# Patient Record
Sex: MALE | Race: WHITE | HISPANIC OR LATINO | Employment: FULL TIME | URBAN - METROPOLITAN AREA
[De-identification: names, ages, dates, MRNs, and addresses within clinical notes are randomized per-mention and may not be internally consistent; named-entity substitution may affect disease eponyms.]

---

## 2017-01-01 PROBLEM — F12.20 CANNABIS USE DISORDER, MODERATE, DEPENDENCE (H): Status: ACTIVE | Noted: 2017-01-01

## 2017-01-01 PROBLEM — F90.0 ADHD, PREDOMINANTLY INATTENTIVE TYPE: Status: ACTIVE | Noted: 2017-01-01

## 2017-01-09 ENCOUNTER — OFFICE VISIT (OUTPATIENT)
Dept: OTHER | Facility: OUTPATIENT CENTER | Age: 22
End: 2017-01-09

## 2017-01-09 DIAGNOSIS — F90.0 ADHD, PREDOMINANTLY INATTENTIVE TYPE: ICD-10-CM

## 2017-01-09 DIAGNOSIS — F41.9 ANXIETY: ICD-10-CM

## 2017-01-09 DIAGNOSIS — F12.20 CANNABIS USE DISORDER, MODERATE, DEPENDENCE (H): ICD-10-CM

## 2017-01-09 DIAGNOSIS — F33.2 MAJOR DEPRESSIVE DISORDER, RECURRENT, SEVERE WITHOUT PSYCHOTIC FEATURES (H): Primary | ICD-10-CM

## 2017-01-09 DIAGNOSIS — F64.0 GENDER DYSPHORIA IN ADOLESCENT AND ADULT: ICD-10-CM

## 2017-02-02 NOTE — PROGRESS NOTES
"[facility] -  Case Progress Note    Date of Service: 1/09/17  Client Name: Jerson Munoz  YOB: 1995  MRN:  [id number]  Treating Provider: Angela Collier (Nic\), Ph.D., Postdoctoral Fellow  Type of Session: Individual  Present in Session: Client Only  Number of Minutes: 53  Treatment plan completed:  2/3/16    Current Symptoms/Status:  Reported a history of gender and anatomic dysphoria related to his birth-assigned sex dating back to childhood and adolescence and high levels of distress about his secondary sex characteristics, expressed a desire to get rid of his breasts.     Endorsed symptoms of depression and anxiety: little interest or pleasure in doing things, feeling down, depressed, or hopeless, feeling tired or having little energy, feeling bad about yourself, and trouble concentrating, feeling worried, trouble relaxing, and feeling afraid, as if something awful might happen. He denied suicidal or self-harm thoughts.    Reported difficulties with concentration, organization, sustaining attention, becoming easily distracted and forgetful, and starting tasks/mental stimulating activities    Hx of alcohol and cannabis use. Reported concern about tolerance to these substances, frequency of use, level of preoccupation, impact on overall functioning, and failed efforts to control use. Client reported previously meeting with a psychiatrist who would not prescribe him medication due to his substance use. Client reported smoking marijuana consistently for the past two years and that he smokes marijuana to  get out of my head  and to be more social. Client reported that he does not  trust myself myself to commit to stopping marijuana so I can get Adderall.  Client also reported drinking alcohol 1-2 times per week. (Client does not appear to meet diagnostic criteria for an Alcohol Use Disorder at this time).    Progress Toward Treatment Goals:   Continued difficulty with organization and " "losing things that is making him feel stressed; discussed recommendation for additional support to assist with managing ADHD and substance use; noted that he is making efforts to take Zoloft more consistently    Intervention: Modality and Description/Response to Intervention:  CBT, interpersonal, and supportive psychotherapy techniques were used to assist client with processing thoughts and feeling associated with gender dysphoria, depression, anxiety, ADHD, and substance use. Client reported that he has been trying to take his Zoloft more consistently. He reported that this has been  going well  but that he did not take his Zoloft today. Reported that his missed group therapy because  I was a wreck that day. I would have been crying all over the place.  Stated that he could not remember what happened but he remembered that he  felt like I couldn t control the chaos at home  and felt like he had \"very low energy.\" Discussed the importance of having support and how both group and individual therapy can be helpful when struggling. Reported that his mother is having some legal issues and may be moving into his basement. Explored client s thoughts and feelings about potentially living with his mother. Client reported that this has increased his depressive and anxiety symptoms. Reported that he has been more emotionally dysregulated, crying more often, and wanting to sleep more. Stated that he is trying to  gain a measure of independence  and feels that this will make it more difficult. Discussed ways to make boundaries with his relationship with his mother in order for him to continue working on his independence. Towards the end of the session, discussed whether client had an intake appointment since calling the [employer] in our last session. Client stated that he has been  avoiding  by ignoring his phone and voicemails. Encouraged to follow through with getting additional support. Client decided to call the [employer] " "Skip in session, which resulted in him making an appointment for a phone intake for later the same day.    Assignment:  None    Interactive Complexity:  None    Diagnosis:  302.85 Gender Dysphoria in Adolescents and Adults  296.33 Major Depressive Disorder, Recurrent, Severe  300.00 Unspecified Anxiety Disorder  314.00 Attention Deficit Hyperactive Disorder, Predominantly inattentive presentation (per client report)   305.30 Cannabis Use Disorder, moderate-severe  R/O Alcohol Use Disorder    Plan / Need for Future Services:  Return for therapy in 1-2 weeks to address symptoms associated with gender dysphoria, depression, anxiety, ADHD, and substance use.      Angela \"Bertin\" Nando, Ph.D.  Postdoctoral Fellow    "

## 2017-02-07 NOTE — PROGRESS NOTES
I did not personally see the patient but I have reviewed and agree with the assessment and plan as documented in this note.  Eugenie Mata, PhD -- Supervisor   Licensed Psychologist

## 2017-02-13 ENCOUNTER — OFFICE VISIT (OUTPATIENT)
Dept: OTHER | Facility: OUTPATIENT CENTER | Age: 22
End: 2017-02-13

## 2017-02-13 DIAGNOSIS — F64.0 GENDER DYSPHORIA IN ADOLESCENT AND ADULT: ICD-10-CM

## 2017-02-13 DIAGNOSIS — F41.9 ANXIETY: ICD-10-CM

## 2017-02-13 DIAGNOSIS — F12.20 CANNABIS USE DISORDER, MODERATE, DEPENDENCE (H): ICD-10-CM

## 2017-02-13 DIAGNOSIS — F90.0 ADHD, PREDOMINANTLY INATTENTIVE TYPE: ICD-10-CM

## 2017-02-13 DIAGNOSIS — F33.2 MAJOR DEPRESSIVE DISORDER, RECURRENT, SEVERE WITHOUT PSYCHOTIC FEATURES (H): Primary | ICD-10-CM

## 2017-02-13 NOTE — PROGRESS NOTES
"[facility] -  Case Progress Note    Date of Service: 2/13/17  Client Name: Jerson Munoz  YOB: 1995  MRN:  [id number]  Treating Provider: Angela Collier (Nic\), Ph.D., Postdoctoral Fellow  Type of Session: Individual  Present in Session: Client Only  Number of Minutes: 40  Treatment plan completed:  2/3/16    Current Symptoms/Status:  Reported a history of gender and anatomic dysphoria related to his birth-assigned sex dating back to childhood and adolescence and high levels of distress about his secondary sex characteristics, expressed a desire to get rid of his breasts.     Endorsed symptoms of depression and anxiety: little interest or pleasure in doing things, feeling down, depressed, or hopeless, feeling tired or having little energy, feeling bad about yourself, and trouble concentrating, feeling worried, trouble relaxing, and feeling afraid, as if something awful might happen. He denied suicidal or self-harm thoughts.    Reported difficulties with concentration, organization, sustaining attention, becoming easily distracted and forgetful, and starting tasks/mental stimulating activities    Hx of alcohol and cannabis use. Reported concern about tolerance to these substances, frequency of use, level of preoccupation, impact on overall functioning, and failed efforts to control use. Client reported previously meeting with a psychiatrist who would not prescribe him medication due to his substance use. Client reported smoking marijuana consistently for the past two years and that he smokes marijuana to  get out of my head  and to be more social. Client reported that he does not  trust myself myself to commit to stopping marijuana so I can get Adderall.  Client also reported drinking alcohol 1-2 times per week. (Client does not appear to meet diagnostic criteria for an Alcohol Use Disorder at this time).    Progress Toward Treatment Goals:   Arrived to the session 20 minutes late. Client " noted that he is continues to be stressed and has been engaging in negative self-talk. Reported avoiding phone calls from the [facility]    Intervention: Modality and Description/Response to Intervention:  CBT, interpersonal, and supportive psychotherapy techniques were used to assist client with processing thoughts and feeling associated with gender dysphoria, depression, anxiety, ADHD, and substance use. Client began the session visibly upset as evidenced by him crying, stating that he was stressed about receiving a letter that he would need to contact his student loan department to prevent his loans from going into default. Discussed steps client could take to call the loan  to address this issue before the deadline. Client stated that he could ask his friend to sit with him while he makes the phone call. Client then stated that he was more interested in attending a social event in the city than making this phone call. Explored client s avoidance and client stated that he would rather do something he would enjoy than make a stressful phone call. Normalized client s difficulty with following through with something difficult and stressful for him. Discussed the importance of prioritizing responsibilities and activities in his life. Client then began to raise his voice stated that he knows that he is dependent on other people to complete his responsibilities. Became argumentative with therapist and noted that he feels like no one can help him or fix this situation for him. Offered to assist client in making this call in session. Client declined and stated that he feels more stressed and depressed since receiving this letter. Noted that the letter is  symbolic that my life is going nowhere  and reported having thoughts that  I keep failing.  Validated client feeling stress and discussed challenging his negative self-talk. Used strengths-based interventions to highlight that client made it to this  "appointment, created steps to complete the phone call, and brought the letter in meaning he prepared for the session. Towards the end of the session, discussed the importance of client receiving additional support. Discussed if he started having appointments with the [facility], and client stated that he is avoiding the phone calls. Throughout the session, client presented as visibly upset (crying), low mood, irritable, and anxious.     Assignment:  None    Interactive Complexity:  Communication difficulties present during the current psychiatric procedure included the need to manage maladaptive communication related to high anxiety, high reactivity, repeated questions, and disagreement that complicated delivery of care. Specifically, client presented as emotionally reactive as evidenced by his crying, raising his voice, and being argumentative at times.     Diagnosis:  302.85 Gender Dysphoria in Adolescents and Adults  296.33 Major Depressive Disorder, Recurrent, Severe  300.00 Unspecified Anxiety Disorder  314.00 Attention Deficit Hyperactive Disorder, Predominantly inattentive presentation (per client report)   305.30 Cannabis Use Disorder, moderate-severe  R/O Alcohol Use Disorder    Plan / Need for Future Services:  Return for therapy in 1-2 weeks to address symptoms associated with gender dysphoria, depression, anxiety, ADHD, and substance use.      Angela \"Bertin\" Nando, Ph.D.  Postdoctoral Fellow    "

## 2017-02-13 NOTE — MR AVS SNAPSHOT
After Visit Summary   2/13/2017    Jerson Munoz    MRN: [id number]           Patient Information     Date Of Birth          1995        Visit Information        Provider Department      2/13/2017 1:00 PM Angela Collier, PhD [facility]        Today's Diagnoses     Major depressive disorder, recurrent, severe without psychotic features (H)    -  1    Anxiety        ADHD, predominantly inattentive type        Cannabis use disorder, moderate, dependence (H)        Gender dysphoria in adolescent and adult           Follow-ups after your visit        Your next 10 appointments already scheduled     Mar 08, 2017  2:00 PM CST   INDIVIDUAL THERAPY with Angela Collier, PhD   [facility] ([facility])    [address]  [address]  [facility] [address]   [phone]            Mar 22, 2017  2:00 PM CDT   INDIVIDUAL THERAPY with Angela Collier,    [facility] ([facility])    [address]  [address]  [facility] [address]   [phone]              Who to contact     Please call your clinic at [phone] to:    Ask questions about your health    Make or cancel appointments    Discuss your medicines    Learn about your test results    Speak to your doctor   If you have compliments or concerns about an experience at your clinic, or if you wish to file a complaint, please contact [facility] Patient Relations at [phone] or email us at [email or web address]         Additional Information About Your Visit        MyChart Information     [employer] gives you secure access to your electronic health record. If you see a primary care provider, you can also send messages to your care team and make appointments. If you have questions, please call your primary care clinic.  If you do not have a primary care provider, please call [phone] and they will assist you.       [employer] is an electronic gateway that provides easy, online access to your medical records. With [employer], you can request a clinic appointment, read your test results, renew a prescription or communicate with your care team.     To access your existing account, please contact your [facility] or call [phone] for assistance.        Care EveryWhere ID     This is your Care EveryWhere ID. This could be used by other organizations to access your [place] medical records  [id number]         Blood Pressure from Last 3 Encounters:   12/14/16 122/71   10/18/16 118/77   04/21/16 114/71    Weight from Last 3 Encounters:   12/14/16 67.6 kg (149 lb)   10/18/16 69.9 kg (154 lb)   04/21/16 59.4 kg (131 lb)              We Performed the Following     Individual Psychotherapy (38-52 min) [78935]     Psychotherapy Interactive Complexity [59673]        Primary Care Provider    None       No address on file        Thank you!     Thank you for choosing [facility]  for your care. Our goal is always to provide you with excellent care. Hearing back from our patients is one way we can continue to improve our services. Please take a few minutes to complete the written survey that you may receive in the mail after your visit with us. Thank you!             Your Updated Medication List - Protect others around you: Learn how to safely use, store and throw away your medicines at www.disposemymeds.org.          This list is accurate as of: 2/13/17 11:59 PM.  Always use your most recent med list.                   Brand Name Dispense Instructions for use    triamcinolone 55 MCG/ACT Inhaler    NASACORT     Spray 2 sprays into both nostrils daily       ZOLOFT PO      Take 200 mg by mouth

## 2017-03-08 ENCOUNTER — OFFICE VISIT (OUTPATIENT)
Dept: OTHER | Facility: OUTPATIENT CENTER | Age: 22
End: 2017-03-08

## 2017-03-08 DIAGNOSIS — F64.0 GENDER DYSPHORIA IN ADOLESCENT AND ADULT: ICD-10-CM

## 2017-03-08 DIAGNOSIS — F41.9 ANXIETY: ICD-10-CM

## 2017-03-08 DIAGNOSIS — F12.20 CANNABIS USE DISORDER, MODERATE, DEPENDENCE (H): Primary | ICD-10-CM

## 2017-03-08 DIAGNOSIS — F90.0 ADHD, PREDOMINANTLY INATTENTIVE TYPE: ICD-10-CM

## 2017-03-08 DIAGNOSIS — F33.2 MAJOR DEPRESSIVE DISORDER, RECURRENT, SEVERE WITHOUT PSYCHOTIC FEATURES (H): ICD-10-CM

## 2017-03-08 ASSESSMENT — ANXIETY QUESTIONNAIRES
3. WORRYING TOO MUCH ABOUT DIFFERENT THINGS: NOT AT ALL
5. BEING SO RESTLESS THAT IT IS HARD TO SIT STILL: NOT AT ALL
6. BECOMING EASILY ANNOYED OR IRRITABLE: NOT AT ALL
7. FEELING AFRAID AS IF SOMETHING AWFUL MIGHT HAPPEN: NOT AT ALL
2. NOT BEING ABLE TO STOP OR CONTROL WORRYING: NOT AT ALL
GAD7 TOTAL SCORE: 0
1. FEELING NERVOUS, ANXIOUS, OR ON EDGE: NOT AT ALL

## 2017-03-08 ASSESSMENT — PATIENT HEALTH QUESTIONNAIRE - PHQ9: 5. POOR APPETITE OR OVEREATING: NOT AT ALL

## 2017-03-08 NOTE — MR AVS SNAPSHOT
After Visit Summary   3/8/2017    Jerson Munoz    MRN: [id number]           Patient Information     Date Of Birth          1995        Visit Information        Provider Department      3/8/2017 2:00 PM Angela Collier, PhD [facility]        Today's Diagnoses     Cannabis use disorder, moderate, dependence (H)    -  1    Major depressive disorder, recurrent, severe without psychotic features (H)        ADHD, predominantly inattentive type        Anxiety        Gender dysphoria in adolescent and adult           Follow-ups after your visit        Your next 10 appointments already scheduled     Mar 22, 2017  2:00 PM CDT   INDIVIDUAL THERAPY with Angela Collier, PhD   [facility]    [address] [facility] [address]  [address]  [facility] [address]   [phone]              Who to contact     Please call your clinic at [phone] to:    Ask questions about your health    Make or cancel appointments    Discuss your medicines    Learn about your test results    Speak to your doctor   If you have compliments or concerns about an experience at your clinic, or if you wish to file a complaint, please contact [facility] Patient Relations at [phone] or email us at [email or web address]         Additional Information About Your Visit        MyChart Information     [employer] gives you secure access to your electronic health record. If you see a primary care provider, you can also send messages to your care team and make appointments. If you have questions, please call your primary care clinic.  If you do not have a primary care provider, please call [phone] and they will assist you.      [employer] is an electronic gateway that provides easy, online access to your medical records. With [employer], you can request a clinic appointment, read your test results, renew a prescription or communicate with your care  team.     To access your existing account, please contact your [facility] or call [phone] for assistance.        Care EveryWhere ID     This is your Care EveryWhere ID. This could be used by other organizations to access your [place] medical records  [id number]         Blood Pressure from Last 3 Encounters:   12/14/16 122/71   10/18/16 118/77   04/21/16 114/71    Weight from Last 3 Encounters:   12/14/16 67.6 kg (149 lb)   10/18/16 69.9 kg (154 lb)   04/21/16 59.4 kg (131 lb)              We Performed the Following     Individual Psychotherapy (53+ min) [43549]     Psychotherapy Interactive Complexity [04786]        Primary Care Provider    None       No address on file        Thank you!     Thank you for choosing [facility]  for your care. Our goal is always to provide you with excellent care. Hearing back from our patients is one way we can continue to improve our services. Please take a few minutes to complete the written survey that you may receive in the mail after your visit with us. Thank you!             Your Updated Medication List - Protect others around you: Learn how to safely use, store and throw away your medicines at www.disposemymeds.org.          This list is accurate as of: 3/8/17 11:59 PM.  Always use your most recent med list.                   Brand Name Dispense Instructions for use    triamcinolone 55 MCG/ACT Inhaler    NASACORT     Spray 2 sprays into both nostrils daily       ZOLOFT PO      Take 200 mg by mouth

## 2017-03-08 NOTE — PROGRESS NOTES
"[facility] -  Case Progress Note    Date of Service: 3/08/17  Client Name: Jerson Munoz  YOB: 1995  MRN:  [id number]  Treating Provider: Angela Collier (Nic\), Ph.D., Postdoctoral Fellow  Type of Session: Individual  Present in Session: Client Only  Number of Minutes: 60  Treatment plan completed: 03/08/17    Current Symptoms/Status:  Reported a history of gender and anatomic dysphoria related to his birth-assigned sex dating back to childhood and adolescence and high levels of distress about his secondary sex characteristics; expressed a desire to get rid of his breasts.     Endorsed symptoms of depression and anxiety: little interest or pleasure in doing things; feeling down, depressed, or hopeless; feeling tired or having little energy; changes in appetite/eating; feeling bad about yourself; trouble concentrating; thinking he'd be better off dead or of hurting himself; hopelessness/helplessness; irritable; ruminating; and anxiety in social situations.      Reported difficulties with concentration, organization, sustaining attention, becoming easily distracted and forgetful, and starting tasks/mental stimulating activities    Hx of alcohol and cannabis use. Reported concern about tolerance to these substances, frequency of use, level of preoccupation, impact on overall functioning, and failed efforts to control use. Client reported previously meeting with a psychiatrist who would not prescribe him medication due to his substance use. Client reported smoking marijuana consistently for the past two years and that he smokes marijuana to  get out of my head  and to be more social. Client also reported drinking alcohol 1-2 times per week. (Client does not appear to meet diagnostic criteria for an Alcohol Use Disorder at this time).    Progress Toward Treatment Goals:   Presented as emotionally reactive, easily irritated, and argumentative; reported smoking marijuana and little to no " "interest in stopping; completed treatment plan update    Intervention: Modality and Description/Response to Intervention:  CBT, interpersonal, and supportive psychotherapy techniques were used to assist client with processing thoughts and feeling associated with gender dysphoria, depression, anxiety, ADHD, and substance use. Client reported that he is \"doing better\" and attributed his improved mood to completing tasks at home, changes in the weather, and smoking marijuana. Client reported that he has been smoking marijuana \"a couple of times a day\" for the past 2-3 weeks. He reported not smoking since yesterday due to him running out of marijuana. Reported little to no desire to stop substance use. Reported that he was \"frustrated with [his] lack of self-discipline.\" Discussed ways to cope with frustrations and complete tasks he does not feel motivated to do. Client reported that his \"brain chemistry keeps me from doing things.\" Discussed what client meant by this comment. Client raised his voice saying that he was not using this as an excuse and that \"self-medicating\" helped him to complete tasks at home. Attempted to identify other ways client can motivate himself to complete tasks. Client raised his voice and stated that it is impossible for him to do tasks because \"my brain chemistry.\" Validated client feeling stuck and frustrated. Client interrupted therapist, became argumentative. Reported with a raised voice volume that his brain keeps him from doing things. Processed client's irritability and argumentative presentation. Client reported being emotionally reactive. Discussed client having the expectation that therapist would respond in similar ways to people in his past. Discussed what happened in client's past. Client agreed to try to listen before reacting in the future. Then, completed treatment plan update. Before ending the session, discussed changes in group therapy and that client will no longer be in " "the Transgender Young Adult group. Client stated he understood and had no questions.    Assignment:  Arrive on time to next appointment    Interactive Complexity:  Communication difficulties present during the current psychiatric procedure included the need to manage maladaptive communication related to high anxiety, high reactivity, repeated questions, and disagreement that complicated delivery of care. Specifically, client presented as emotionally reactive as evidenced by him raising his voice and being argumentative at times.     Diagnosis:  302.85 Gender Dysphoria in Adolescents and Adults  296.33 Major Depressive Disorder, Recurrent, Severe  300.00 Unspecified Anxiety Disorder  314.00 Attention Deficit Hyperactive Disorder, Predominantly inattentive presentation (per client report)   305.30 Cannabis Use Disorder, moderate-severe  R/O Alcohol Use Disorder    Plan / Need for Future Services:  Return for therapy in 1-2 weeks to address symptoms associated with gender dysphoria, depression, anxiety, ADHD, and substance use.      Angela \"Bertin\"Nando, Ph.D.  Postdoctoral Fellow  "

## 2017-03-18 ASSESSMENT — PATIENT HEALTH QUESTIONNAIRE - PHQ9: SUM OF ALL RESPONSES TO PHQ QUESTIONS 1-9: 10

## 2017-03-18 ASSESSMENT — ANXIETY QUESTIONNAIRES: GAD7 TOTAL SCORE: 0

## 2017-03-20 NOTE — PROGRESS NOTES
I did not personally see the patient.  I reviewed and agree with the assessment and plan as documented in this note. Nanette Viveros, PhD, LP

## 2018-05-22 ENCOUNTER — OFFICE VISIT - HEALTHEAST (OUTPATIENT)
Dept: FAMILY MEDICINE | Facility: CLINIC | Age: 23
End: 2018-05-22

## 2018-05-22 DIAGNOSIS — F32.A DEPRESSION: ICD-10-CM

## 2018-05-22 DIAGNOSIS — J31.0 CHRONIC RHINITIS, UNSPECIFIED TYPE: ICD-10-CM

## 2018-05-22 DIAGNOSIS — F90.0 ATTENTION DEFICIT HYPERACTIVITY DISORDER (ADHD), PREDOMINANTLY INATTENTIVE TYPE: ICD-10-CM

## 2018-05-22 ASSESSMENT — MIFFLIN-ST. JEOR: SCORE: 1543.71

## 2018-05-30 ENCOUNTER — OFFICE VISIT - HEALTHEAST (OUTPATIENT)
Dept: ALLERGY | Facility: CLINIC | Age: 23
End: 2018-05-30

## 2018-05-30 DIAGNOSIS — J30.89 CHRONIC NONSEASONAL ALLERGIC RHINITIS DUE TO OTHER ALLERGEN: ICD-10-CM

## 2018-05-30 ASSESSMENT — MIFFLIN-ST. JEOR: SCORE: 1562.31

## 2019-12-03 ENCOUNTER — TRANSFERRED RECORDS (OUTPATIENT)
Dept: HEALTH INFORMATION MANAGEMENT | Facility: CLINIC | Age: 24
End: 2019-12-03

## 2020-03-10 ENCOUNTER — HEALTH MAINTENANCE LETTER (OUTPATIENT)
Age: 25
End: 2020-03-10

## 2020-03-24 ENCOUNTER — OFFICE VISIT (OUTPATIENT)
Dept: FAMILY MEDICINE | Facility: CLINIC | Age: 25
End: 2020-03-24

## 2020-03-24 VITALS
SYSTOLIC BLOOD PRESSURE: 107 MMHG | HEIGHT: 66 IN | WEIGHT: 136.8 LBS | HEART RATE: 61 BPM | RESPIRATION RATE: 16 BRPM | DIASTOLIC BLOOD PRESSURE: 70 MMHG | TEMPERATURE: 98.6 F | OXYGEN SATURATION: 100 % | BODY MASS INDEX: 21.98 KG/M2

## 2020-03-24 DIAGNOSIS — F33.1 MAJOR DEPRESSIVE DISORDER, RECURRENT EPISODE, MODERATE (H): Primary | ICD-10-CM

## 2020-03-24 DIAGNOSIS — F64.0 GENDER DYSPHORIA IN ADOLESCENT AND ADULT: Chronic | ICD-10-CM

## 2020-03-24 DIAGNOSIS — F41.9 ANXIETY: ICD-10-CM

## 2020-03-24 DIAGNOSIS — F12.20 CANNABIS USE DISORDER, MODERATE, DEPENDENCE (H): ICD-10-CM

## 2020-03-24 PROBLEM — J31.0 CHRONIC RHINITIS: Chronic | Status: ACTIVE | Noted: 2018-05-23

## 2020-03-24 PROBLEM — J31.0 CHRONIC RHINITIS: Status: ACTIVE | Noted: 2018-05-23

## 2020-03-24 RX ORDER — AZELASTINE HYDROCHLORIDE, FLUTICASONE PROPIONATE 137; 50 UG/1; UG/1
2 SPRAY, METERED NASAL 2 TIMES DAILY
COMMUNITY
End: 2020-03-28

## 2020-03-24 RX ORDER — TESTOSTERONE 20.25 MG/1.25G
GEL TOPICAL
COMMUNITY
Start: 2020-02-19 | End: 2021-03-15 | Stop reason: ALTCHOICE

## 2020-03-24 RX ORDER — BUSPIRONE HYDROCHLORIDE 10 MG/1
10 TABLET ORAL
COMMUNITY
End: 2020-12-01

## 2020-03-24 RX ORDER — AZELASTINE 1 MG/ML
137 SPRAY, METERED NASAL
COMMUNITY
Start: 2018-05-30 | End: 2020-03-26

## 2020-03-24 SDOH — SOCIAL STABILITY: SOCIAL INSECURITY: ARE YOU MARRIED, WIDOWED, DIVORCED, SEPARATED, NEVER MARRIED, OR LIVING WITH A PARTNER?: NEVER MARRIED

## 2020-03-24 SDOH — SOCIAL STABILITY: SOCIAL INSECURITY
WITHIN THE LAST YEAR, HAVE YOU BEEN RAPED OR FORCED TO HAVE ANY KIND OF SEXUAL ACTIVITY BY YOUR PARTNER OR EX-PARTNER?: NO

## 2020-03-24 SDOH — HEALTH STABILITY: MENTAL HEALTH: HOW OFTEN DO YOU HAVE A DRINK CONTAINING ALCOHOL?: MONTHLY OR LESS

## 2020-03-24 SDOH — HEALTH STABILITY: MENTAL HEALTH: HOW MANY DRINKS CONTAINING ALCOHOL DO YOU HAVE ON A TYPICAL DAY WHEN YOU ARE DRINKING?: 1 OR 2

## 2020-03-24 SDOH — SOCIAL STABILITY: SOCIAL INSECURITY
WITHIN THE LAST YEAR, HAVE YOU BEEN KICKED, HIT, SLAPPED, OR OTHERWISE PHYSICALLY HURT BY YOUR PARTNER OR EX-PARTNER?: NO

## 2020-03-24 SDOH — SOCIAL STABILITY: SOCIAL INSECURITY: WITHIN THE LAST YEAR, HAVE YOU BEEN HUMILIATED OR EMOTIONALLY ABUSED IN OTHER WAYS BY YOUR PARTNER OR EX-PARTNER?: NO

## 2020-03-24 SDOH — HEALTH STABILITY: MENTAL HEALTH: HOW OFTEN DO YOU HAVE SIX OR MORE DRINKS ON ONE OCCASION?: LESS THAN MONTHLY

## 2020-03-24 SDOH — HEALTH STABILITY: MENTAL HEALTH
DO YOU FEEL STRESS - TENSE, RESTLESS, NERVOUS, OR ANXIOUS, OR UNABLE TO SLEEP AT NIGHT BECAUSE YOUR MIND IS TROUBLED ALL THE TIME - THESE DAYS?: TO SOME EXTENT

## 2020-03-24 SDOH — SOCIAL STABILITY: SOCIAL INSECURITY: WITHIN THE LAST YEAR, HAVE YOU BEEN AFRAID OF YOUR PARTNER OR EX-PARTNER?: NO

## 2020-03-24 ASSESSMENT — ANXIETY QUESTIONNAIRES
IF YOU CHECKED OFF ANY PROBLEMS ON THIS QUESTIONNAIRE, HOW DIFFICULT HAVE THESE PROBLEMS MADE IT FOR YOU TO DO YOUR WORK, TAKE CARE OF THINGS AT HOME, OR GET ALONG WITH OTHER PEOPLE: SOMEWHAT DIFFICULT
6. BECOMING EASILY ANNOYED OR IRRITABLE: SEVERAL DAYS
GAD7 TOTAL SCORE: 4
3. WORRYING TOO MUCH ABOUT DIFFERENT THINGS: NOT AT ALL
5. BEING SO RESTLESS THAT IT IS HARD TO SIT STILL: NOT AT ALL
7. FEELING AFRAID AS IF SOMETHING AWFUL MIGHT HAPPEN: NOT AT ALL
2. NOT BEING ABLE TO STOP OR CONTROL WORRYING: NOT AT ALL
1. FEELING NERVOUS, ANXIOUS, OR ON EDGE: SEVERAL DAYS

## 2020-03-24 ASSESSMENT — PATIENT HEALTH QUESTIONNAIRE - PHQ9
5. POOR APPETITE OR OVEREATING: MORE THAN HALF THE DAYS
SUM OF ALL RESPONSES TO PHQ QUESTIONS 1-9: 8

## 2020-03-24 ASSESSMENT — MIFFLIN-ST. JEOR: SCORE: 1543.27

## 2020-03-24 NOTE — PROGRESS NOTES
Preceptor Attestation:   Patient seen, evaluated and discussed with the resident. I have verified the content of the note, which accurately reflects my assessment of the patient and the plan of care.   Supervising Physician:  Breonna Pham MD

## 2020-03-24 NOTE — PATIENT INSTRUCTIONS
Here is the summary from today's visit.    Fill the release of information, drop it off at clinic whenever it's convenient.    Follow up plan    Please call the  at [phone] to schedule an appointment as needed.       Thank you for coming to [facility] today!  Patience Adams MD   >>>>> <<<<<  If you had laboratory testing and results need quick action we will call you at # [phone] (home) . If this is not the best number, please call our clinic or stop by the  to update your contact information.  If you need any refills please call your pharmacy and they will contact us. If you need to  your refill at a new pharmacy, please contact the new pharmacy directly. The new pharmacy will help you get your medications transferred faster.   If you have any concerns about today's visit or wish to schedule another appointment, please call our office during normal business hours [phone] (8-5:00 M-F)  If a referral was made to a [facility] and you don't get a call from central scheduling, please call [phone].  If a mammogram was ordered, call [facility] at [phone] to schedule or change your appointment.  If you had an XRay/CT/Ultrasound/MRI ordered, the number is [phone] to schedule or change your radiology appointment.     If you have urgent medical concerns please call [phone] at any time of the day.
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
## 2020-03-25 DIAGNOSIS — J31.0 CHRONIC RHINITIS: Primary | Chronic | ICD-10-CM

## 2020-03-25 ASSESSMENT — ANXIETY QUESTIONNAIRES: GAD7 TOTAL SCORE: 4

## 2020-03-25 NOTE — TELEPHONE ENCOUNTER
Verify that the refill encounter hasn't been started Yes    [facility] phone call message- patient requesting a refill:    Full Medication Name: azelastine (ASTELIN) 0.1 % nasal spray    Dose:      Pharmacy confirmed as   [id number] - [place], [place] - [address] AT [facility] [address]  [address]  [place] [facility] [address]  Phone: [phone] Fax: [phone]    [id number] - [place] - [address] AT [facility] [place] [address]  [address] [place] [place] [address]  Phone: [phone] Fax: [phone]    [address] [address]  Phone: [phone] Fax: [phone]  : Yes    Medication tab checked to see if medication has been sent  no    Additional Comments: pt request rx refill    OK to leave a message on voice mail? Yes    Advised patient refill may take up to 2 business days? Yes    Primary language: English      needed? No    Call taken on March 25, 2020 at 4:47 PM by Rusty Alonso    Route to [facility] MED REFILL

## 2020-03-25 NOTE — TELEPHONE ENCOUNTER
"Request for medication refill: azelastine (ASTELIN) 0.1 % nasal spray    Providers if patient needs an appointment and you are willing to give a one month supply please refill for one month and  send a letter/MyChart using \".SMILLIMITEDREFILL\" .smillimited and route chart to \"[facility] \" (Giving one month refill in non controlled medications is strongly recommended before denial)    If refill has been denied, meaning absolutely no refills without visit, please complete the smart phrase \".smirxrefuse\" and route it to the \"P [facility] MED REFILLS\"  pool to inform the patient and the pharmacy.    Jeffry Dash MA        "

## 2020-03-26 RX ORDER — AZELASTINE 1 MG/ML
1-2 SPRAY, METERED NASAL 2 TIMES DAILY
Qty: 30 ML | Refills: 1 | Status: SHIPPED | OUTPATIENT
Start: 2020-03-26 | End: 2020-11-04

## 2020-04-11 PROBLEM — E78.5 DYSLIPIDEMIA: Chronic | Status: ACTIVE | Noted: 2019-12-03

## 2020-04-11 PROBLEM — F12.20 CANNABIS USE DISORDER, MODERATE, DEPENDENCE (H): Chronic | Status: ACTIVE | Noted: 2017-01-01

## 2020-04-11 PROBLEM — E55.9 VITAMIN D DEFICIENCY: Status: ACTIVE | Noted: 2019-12-03

## 2020-06-29 ENCOUNTER — TELEPHONE (OUTPATIENT)
Dept: FAMILY MEDICINE | Facility: CLINIC | Age: 25
End: 2020-06-29

## 2020-06-29 DIAGNOSIS — F41.9 ANXIETY: Chronic | ICD-10-CM

## 2020-06-29 DIAGNOSIS — F33.1 MAJOR DEPRESSIVE DISORDER, RECURRENT EPISODE, MODERATE (H): Primary | Chronic | ICD-10-CM

## 2020-06-29 NOTE — TELEPHONE ENCOUNTER
Verify that the refill encounter hasn't been started Yes    [facility] phone call message- patient requesting a refill:    Full Medication Name: Sertraline HCl (ZOLOFT PO)     Dose: Take 100 mg by mouth Take 1 1/2 tablet once a day - Oral      Pharmacy confirmed as   [facility], [address] [place] [address]  [address] [place] [address]  Phone: [phone] Fax: [phone]  : Yes    Medication tab checked to see if medication has been sent  Yes    Additional Comments: Patient requesting refill of medication listed above. Author informed patient that medication may not be filled due to it be self reportated but the patient stated that they established care with the clinic to get refills for this medication.     OK to leave a message on voice mail? Yes    Advised patient refill may take up to 2 business days? Yes    Primary language: English      needed? No    Call taken on June 29, 2020 at 8:30 AM by Patricia Chacko to [facility] MED REFILL

## 2020-06-29 NOTE — TELEPHONE ENCOUNTER
"Request for medication refill:  Sertraline HCI (Zoloft PO)    -  Please advise  \"Patient requesting refill of medication listed above. Author informed patient that medication may not be filled due to it be self reportated but the patient stated that they established care with the clinic to get refills for this medication. \"    Providers if patient needs an appointment and you are willing to give a one month supply please refill for one month and  send a letter/MyChart using \".SMILLIMITEDREFILL\" .smillimited and route chart to \"P [facility] \" (Giving one month refill in non controlled medications is strongly recommended before denial)    If refill has been denied, meaning absolutely no refills without visit, please complete the smart phrase \".smirxrefuse\" and route it to the \"P [facility] MED REFILLS\"  pool to inform the patient and the pharmacy.    Jeffry Dash MA        "

## 2020-06-30 RX ORDER — SERTRALINE HYDROCHLORIDE 100 MG/1
150 TABLET, FILM COATED ORAL DAILY
Qty: 135 TABLET | Refills: 0 | Status: SHIPPED | OUTPATIENT
Start: 2020-06-30 | End: 2020-11-04

## 2020-06-30 NOTE — TELEPHONE ENCOUNTER
Patient calling to check status of requested refill. Patient stated they have been out of medication for a few days. Please advise.

## 2020-07-01 NOTE — TELEPHONE ENCOUNTER
Approved sertraline refill for 90 days. Medication verified with outside records.     PHQ 1/22/2016 3/8/2017 3/24/2020   PHQ-9 Total Score 10 10 8   Q9: Thoughts of better off dead/self-harm past 2 weeks Not at all Several days Not at all     NOAH-7 SCORE 1/22/2016 3/8/2017 3/24/2020   Total Score 2 0 4     Patience Adams MD

## 2020-07-03 ENCOUNTER — COMMUNICATION - HEALTHEAST (OUTPATIENT)
Dept: ALLERGY | Facility: CLINIC | Age: 25
End: 2020-07-03

## 2020-08-14 ENCOUNTER — OFFICE VISIT (OUTPATIENT)
Dept: URGENT CARE | Facility: URGENT CARE | Age: 25
End: 2020-08-14
Payer: COMMERCIAL

## 2020-08-14 VITALS
SYSTOLIC BLOOD PRESSURE: 122 MMHG | HEART RATE: 62 BPM | BODY MASS INDEX: 21.86 KG/M2 | WEIGHT: 136 LBS | HEIGHT: 66 IN | DIASTOLIC BLOOD PRESSURE: 85 MMHG | RESPIRATION RATE: 16 BRPM | OXYGEN SATURATION: 98 % | TEMPERATURE: 97.2 F

## 2020-08-14 DIAGNOSIS — J02.9 ACUTE SORE THROAT: Primary | ICD-10-CM

## 2020-08-14 LAB
DEPRECATED S PYO AG THROAT QL EIA: NEGATIVE
SPECIMEN SOURCE: NORMAL

## 2020-08-14 PROCEDURE — 99203 OFFICE O/P NEW LOW 30 MIN: CPT | Performed by: STUDENT IN AN ORGANIZED HEALTH CARE EDUCATION/TRAINING PROGRAM

## 2020-08-14 PROCEDURE — 87651 STREP A DNA AMP PROBE: CPT | Performed by: STUDENT IN AN ORGANIZED HEALTH CARE EDUCATION/TRAINING PROGRAM

## 2020-08-14 PROCEDURE — 40001204 ZZHCL STATISTIC STREP A RAPID: Performed by: STUDENT IN AN ORGANIZED HEALTH CARE EDUCATION/TRAINING PROGRAM

## 2020-08-14 ASSESSMENT — MIFFLIN-ST. JEOR: SCORE: 1539.64

## 2020-08-14 NOTE — PATIENT INSTRUCTIONS
Patient Education     Pharyngitis (Sore Throat), Report Pending    Pharyngitis (sore throat) is often due to a virus. It can also be caused by streptococcus (strep), bacteria. This is often called strep throat. Both viral and strep infections can cause throat pain that is worse when swallowing, aching all over, headache, and fever. Both types of infections are contagious. They may be spread by coughing, kissing, or touching others after touching your mouth or nose.  A test has been done to find out if you or your child have strep throat. Call this facility or your healthcare provider if you were not given your test results. If the test is positive for strep infection, you will need to take antibiotic medicines. A prescription can be called into your pharmacy at that time. If the test is negative, you probably have a viral pharyngitis. This does not need to be treated with antibiotics. Until you receive the results of the strep test, you should stay home from work. If your child is being tested, he or she should stay home from school.  Home care    Rest at home. Drink plenty of fluids so you won't get dehydrated.    If the test is positive for strep, you or your child should not go to work or school for the first 2 days of taking the antibiotics. After this time, you or your child will not be contagious. You or your child can then return to work or school when feeling better.     Use the antibiotic medicine for the full 10 days. Do not stop the medicine even if you or your child feel better. This is very important to make sure the infection is fully treated. It is also important to prevent medicine-resistant germs from growing. If you or your child were given an antibiotic shot, no more antibiotics are needed.    Use throat lozenges or numbing throat sprays to help reduce pain. Gargling with warm salt water will also help reduce throat pain. Dissolve 1/2 teaspoon of salt in 1 glass of warm water. Children can sip  on juice or a popsicle. Children 5 years and older can also suck on a lollipop or hard candy.    Don't eat salty or spicy foods or give them to your child. These can irritate the throat.  Other medicine for a child: You can give your child acetaminophen for fever, fussiness, or discomfort. In babies over 6 months of age, you may use ibuprofen instead of acetaminophen. If your child has chronic liver or kidney disease or ever had a stomach ulcer or GI bleeding, talk with your child s healthcare provider before giving these medicines. Aspirin should never be used by any child under 18 years of age who has a fever. It may cause severe liver damage.  Other medicine for an adult: You may use acetaminophen or ibuprofen to control pain or fever, unless another medicine was prescribed for this. If you have chronic liver or kidney disease or ever had a stomach ulcer or GI bleeding, talk with your healthcare provider before using these medicines.  Follow-up care  Follow up with your healthcare provider or our staff if you or your child don't get better over the next week.  When to seek medical advice  Call your healthcare provider right away if any of these occur:    Fever as directed by your healthcare provider. For children, seek care if:  ? Your child is of any age and has repeated fevers above 104 F (40 C).  ? Your child is younger than 2 years of age and has a fever of 100.4 F (38 C) for more than 1 day.  ? Your child is 2 years old or older and has a fever of 100.4 F (38 C) for more than 3 days.    New or worsening ear pain, sinus pain, or headache    Painful lumps in the back of neck    Stiff neck    Lymph nodes are getting larger     Can t swallow liquids, a lot of drooling, or can t open mouth wide due to throat pain    Signs of dehydration, such as very dark urine or no urine, sunken eyes, dizziness    Trouble breathing or noisy breathing    Muffled voice    New rash    Other symptoms getting worse  Prevention  Here  are steps you can take to help prevent an infection:    Keep good hand washing habits.    Don t have close contact with people who have sore throats, colds, or other upper respiratory infections.    Don t smoke, and stay away from secondhand smoke.    Stay up to date with of your vaccines.  Date Last Reviewed: 11/1/2017 2000-2019 [employer]. [address] [place], [place] [address]. All rights reserved. This information is not intended as a substitute for professional medical care. Always follow your healthcare professional's instructions.

## 2020-08-14 NOTE — PROGRESS NOTES
"       NATALIIA Arthur is a 24 year old  adult with a PMH significant for:     Patient Active Problem List   Diagnosis     Gender dysphoria in adolescent and adult     Anxiety     Major depressive disorder, recurrent episode, moderate (H)     Cannabis use disorder, moderate, dependence (H)     Chronic rhinitis     Dyslipidemia     Vitamin D deficiency       Acute Illness   Concerns: fever, sore throat  When did it start? Last Wednesday 8/5/20  Is it getting better, worse or staying the same? 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    Therapies Tried and outcome: Tylenol/IBU, Details: some relief          REVIEW OF SYSTEMS     Constitutional, HEENT, cardiovascular, pulmonary, GI, , musculoskeletal, neuro, skin, endocrine and psych systems are negative, except as otherwise noted.          OBJECTIVE     Vitals:    08/14/20 1749   BP: 122/85   Pulse: 62   Resp: 16   Temp: 97.2  F (36.2  C)   TempSrc: Oral   SpO2: 98%   Weight: 61.7 kg (136 lb)   Height: 1.676 m (5' 6\")     Body mass index is 21.95 kg/m .    Constitutional: Awake, alert, cooperative, no acute distress, and appears stated age.  Eyes: sclera clear, conjunctiva normal.  ENT: NC/AT, MMM, negative for peritonsilar abscess/trismus  Neck: Supple, symmetrical, trachea midline  Back: Symmetric, no curvature  Lungs: No increased WOB, CTABL, no crackles or wheezing appreciated.  Cardiovascular: RRR, normal S1 and S2, no S3 or S4, and no murmur appreciated.  Abdomen: Normal BS, soft, non-distended, non-tender  Musculoskeletal: No redness, warmth, or swelling of " the joints. Tone is normal.  Neurologic: A&Ox3.  Cranial nerves II-XII are grossly intact.  Sensory is intact and gait is normal.  Neuropsychiatric: Normal affect, mood, orientation, memory and insight.  Skin: No visible rashes, erythema, pallor, petechia or purpura.    Results for orders placed or performed in visit on 08/14/20 (from the past 24 hour(s))   Streptococcus A Rapid Scr w Reflx to PCR    Specimen: Throat   Result Value Ref Range    Strep Specimen Description Throat     Streptococcus Group A Rapid Screen Negative NEG^Negative        ASSESSMENT AND PLAN     Diagnoses and all orders for this visit:    Acute sore throat  Likely viral in etiology. Reassuring that COVID and rapid strep is negative. Advised patient on symptomatic management and hydration.   -     Streptococcus A Rapid Scr w Reflx to PCR  -     Group A Streptococcus PCR Throat Swab        Return if symptoms worsen or fail to improve.    Peter Pelletier MD  8/14/2020

## 2020-08-15 LAB
SPECIMEN SOURCE: NORMAL
STREP GROUP A PCR: NOT DETECTED

## 2020-11-04 ENCOUNTER — MYC REFILL (OUTPATIENT)
Dept: FAMILY MEDICINE | Facility: CLINIC | Age: 25
End: 2020-11-04

## 2020-11-04 DIAGNOSIS — F41.9 ANXIETY: Chronic | ICD-10-CM

## 2020-11-04 DIAGNOSIS — F33.1 MAJOR DEPRESSIVE DISORDER, RECURRENT EPISODE, MODERATE (H): Chronic | ICD-10-CM

## 2020-11-04 DIAGNOSIS — J31.0 CHRONIC RHINITIS: Chronic | ICD-10-CM

## 2020-11-04 RX ORDER — SERTRALINE HYDROCHLORIDE 100 MG/1
150 TABLET, FILM COATED ORAL DAILY
Qty: 135 TABLET | Refills: 0 | Status: SHIPPED | OUTPATIENT
Start: 2020-11-04 | End: 2021-02-04

## 2020-11-04 RX ORDER — AZELASTINE 1 MG/ML
1-2 SPRAY, METERED NASAL 2 TIMES DAILY
Qty: 30 ML | Refills: 1 | Status: SHIPPED | OUTPATIENT
Start: 2020-11-04 | End: 2021-02-11

## 2020-11-04 NOTE — TELEPHONE ENCOUNTER
"Patient requesting refill of azelastine nasal spray via mychart. Last office visit 3/24/20. Routing to PCP to approve if appropriate.   Eunice Wilson RN      Request for medication refill:    Providers if patient needs an appointment and you are willing to give a one month supply please refill for one month and  send a letter/MyChart using \".SMILLIMITEDREFILL\" .smillimited and route chart to \"P [facility] \" (Giving one month refill in non controlled medications is strongly recommended before denial)    If refill has been denied, meaning absolutely no refills without visit, please complete the smart phrase \".smirxrefuse\" and route it to the \"P [facility] MED REFILLS\"  pool to inform the patient and the pharmacy.    Eunice Wilson RN        "

## 2020-11-04 NOTE — TELEPHONE ENCOUNTER

## 2020-12-27 ENCOUNTER — HEALTH MAINTENANCE LETTER (OUTPATIENT)
Age: 25
End: 2020-12-27

## 2021-02-02 DIAGNOSIS — F33.1 MAJOR DEPRESSIVE DISORDER, RECURRENT EPISODE, MODERATE (H): Chronic | ICD-10-CM

## 2021-02-02 DIAGNOSIS — F41.9 ANXIETY: Chronic | ICD-10-CM

## 2021-02-02 NOTE — TELEPHONE ENCOUNTER

## 2021-02-04 RX ORDER — SERTRALINE HYDROCHLORIDE 100 MG/1
150 TABLET, FILM COATED ORAL DAILY
Qty: 45 TABLET | Refills: 0 | Status: SHIPPED | OUTPATIENT
Start: 2021-02-04 | End: 2021-03-15

## 2021-02-04 NOTE — TELEPHONE ENCOUNTER
Received request for sertraline refill. Patient seen once at [facility] in 3/2020, was supposed to follow-up a month later, but has not been seen since.   30 day Rx approved, no more refill without visit. Message added to medication instructions, forwarded to FD to contact patient to offer appointment.   Patience Adams MD

## 2021-02-11 DIAGNOSIS — J31.0 CHRONIC RHINITIS: Chronic | ICD-10-CM

## 2021-02-11 RX ORDER — AZELASTINE 1 MG/ML
1-2 SPRAY, METERED NASAL 2 TIMES DAILY
Qty: 30 ML | Refills: 1 | Status: SHIPPED | OUTPATIENT
Start: 2021-02-11 | End: 2021-03-01

## 2021-03-01 DIAGNOSIS — J31.0 CHRONIC RHINITIS: Chronic | ICD-10-CM

## 2021-03-01 RX ORDER — AZELASTINE 1 MG/ML
1-2 SPRAY, METERED NASAL 2 TIMES DAILY
Qty: 30 ML | Refills: 1 | Status: SHIPPED | OUTPATIENT
Start: 2021-03-01 | End: 2021-03-15

## 2021-03-01 NOTE — TELEPHONE ENCOUNTER

## 2021-03-14 NOTE — PROGRESS NOTES
[facility] Clinic visit      Assessment & Plan     Jerson is a 25 year old male, who was assessed for the following problems:    Problem List Items Addressed This Visit        Medium    Gender dysphoria in adolescent and adult - Primary (Chronic)     Continuing testosterone at [facility] ([place] [facility], patient was not aware that we provided gender support care at our clinic). Doing labs today as not recently done at [facility]. Doing well on stable dose.   Refilled T as patient almost out.   Patient signing PEREZ for [facility] records.            Relevant Medications    testosterone (ANDROGEL 1.62 % PUMP) 20.25 MG/ACT gel    Other Relevant Orders    Comprehensive metabolic panel (Completed)    Testosterone total (Completed)    Hemoglobin A1c (Completed)    CBC with platelets (Completed)    Major depressive disorder, recurrent episode, moderate (H) (Chronic)     Depressed mood well controlled, no SI currently. Here mostly to discussed worsened social anxiety.          Relevant Medications    sertraline (ZOLOFT) 100 MG tablet    busPIRone (BUSPAR) 10 MG tablet    Dyslipidemia (Chronic)    Relevant Orders    Lipid panel reflex to direct LDL Non-fasting (Completed)       Low    Anxiety (Chronic)     Reports that it is mostly social anxiety, worsened recently by having to interact more over the phone at work. Not so much with gender ID since started hormones.   Refilled sertraline, buspirone, no changes in dose  Patient interested in therapy, concerned about insurance coverage/cost.   Provided list of trans-friendly therapists. Encouraged to contact insurance to enquire about this and possible gender-reaffirming surgery in the future.          Relevant Medications    sertraline (ZOLOFT) 100 MG tablet    busPIRone (BUSPAR) 10 MG tablet    Chronic rhinitis (Chronic)     Well controlled on azelastine.   Did allergen test a while ago that was + for dust mites, would like to start a new medication he's researched.  Placed referral  to allergy for further evaluation and management.          Relevant Medications    azelastine (ASTELIN) 0.1 % nasal spray    Other Relevant Orders    ALLERGY/ASTHMA ADULT REFERRAL - INTERNAL         Assessment requiring an independent historian(s) - family - mother    Medications Discontinued During This Encounter   Medication Reason     Testosterone 20.25 MG/1.25GM (1.62%) GEL Alternate therapy     busPIRone (BUSPAR) 10 MG tablet Reorder     sertraline (ZOLOFT) 100 MG tablet Reorder     azelastine (ASTELIN) 0.1 % nasal spray Reorder     Follow-up in 3-6 months pending results  Due for physical    [facility]    Options for treatment and follow-up care were reviewed with the patient/caregivers. They engaged in the decision making process and verbalized understanding of the options discussed and agreed with the final plan.    Patience Adams MD  Family Medicine Resident [facility], PGY-3  Phone: [phone]    [facility]    Subjective   History obtained from patient, mother, and chart review.  Patient speaks English,  was not present for this visit.      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 The family is trying to get her in to a specific therapist.    Still doing testosterone, but at [facility]. Needs refills.   Sexually attracted to cis-male  Romantically attracted to all genders    Not sexually active currently   No plan for biological children    ROS  General    Fat redistribution:  no    Weight change: no HEENT    Voice change: YES     Cardiovascular (CV)    Chest Pains: no    Shortness of breath: no Chest    Decreased exercise tolerance:  no    Breast changes/development: not applicable     Gastrointestinal (GI)    Abdominal pain: no    Change in appetite: no Skin    Acne or oily skin: yes, not bothersome    Change in hair: YES - some     Genitourinary ()    Abnormal vaginal bleeding: no     Decreased spontaneous erections: not applicable    Change in libido: YES - not a problem    New sexual partners: no Musculoskeletal    Leg pain or swelling: not applicable     Mental health:  PHQ 1/22/2016 3/8/2017 3/24/2020   PHQ-9 Total Score 10 10 8   Q9: Thoughts of better off dead/self-harm past 2 weeks Not at all Several days Not at all     NOAH-7 SCORE 1/22/2016 3/8/2017 3/24/2020   Total Score 2 0 4       Objective   Physical Exam  /81   Pulse 73   Temp 98  F (36.7  C) (Oral)   Resp 16   Wt 60.3 kg (133 lb)   SpO2 99%   BMI 21.47 kg/m      Vitals were reviewed and were normal     General: very pleasant, well appearing.    HEENT: No signs of trauma. No rhinorrhea. Moist mucous membranes.   Eyes: Conjunctivae and sclerae are normal. Pupils are symmetric.   Neck: Supple.  Resp: No respiratory distress. Normal breath sounds throughout without rales/wheezing.   CV: normal RRR, no murmurs. Normal capillary refill. No leg swelling.  MSK: Normal range of motion. No obvious deformity.  Neuro: The patient is alert and interactive. Speech appears normal. No gross focal symptoms. Ambulatory.  Skin: No rash or sign of trauma noted on exposed skin.   Psych: Affect is appropriate and concordant with mood, behavior is normal.     Results  Results pending

## 2021-03-15 ENCOUNTER — OFFICE VISIT (OUTPATIENT)
Dept: FAMILY MEDICINE | Facility: CLINIC | Age: 26
End: 2021-03-15

## 2021-03-15 VITALS
HEART RATE: 73 BPM | SYSTOLIC BLOOD PRESSURE: 118 MMHG | BODY MASS INDEX: 21.47 KG/M2 | TEMPERATURE: 98 F | WEIGHT: 133 LBS | DIASTOLIC BLOOD PRESSURE: 81 MMHG | OXYGEN SATURATION: 99 % | RESPIRATION RATE: 16 BRPM

## 2021-03-15 DIAGNOSIS — F64.0 GENDER DYSPHORIA IN ADOLESCENT AND ADULT: Primary | Chronic | ICD-10-CM

## 2021-03-15 DIAGNOSIS — F33.1 MAJOR DEPRESSIVE DISORDER, RECURRENT EPISODE, MODERATE (H): Chronic | ICD-10-CM

## 2021-03-15 DIAGNOSIS — J31.0 CHRONIC RHINITIS: Chronic | ICD-10-CM

## 2021-03-15 DIAGNOSIS — E78.5 DYSLIPIDEMIA: Chronic | ICD-10-CM

## 2021-03-15 DIAGNOSIS — F41.9 ANXIETY: Chronic | ICD-10-CM

## 2021-03-15 LAB
ALBUMIN SERPL-MCNC: 4.4 G/DL (ref 3.4–5)
ALP SERPL-CCNC: 72 U/L (ref 40–150)
ALT SERPL W P-5'-P-CCNC: 15 U/L (ref 0–70)
ANION GAP SERPL CALCULATED.3IONS-SCNC: 6 MMOL/L (ref 3–14)
AST SERPL W P-5'-P-CCNC: 13 U/L (ref 0–45)
BILIRUB SERPL-MCNC: 0.5 MG/DL (ref 0.2–1.3)
BUN SERPL-MCNC: 10 MG/DL (ref 7–30)
CALCIUM SERPL-MCNC: 9.6 MG/DL (ref 8.5–10.1)
CHLORIDE SERPL-SCNC: 107 MMOL/L (ref 94–109)
CHOLEST SERPL-MCNC: 229 MG/DL
CO2 SERPL-SCNC: 28 MMOL/L (ref 20–32)
CREAT SERPL-MCNC: 0.69 MG/DL (ref 0.66–1.25)
ERYTHROCYTE [DISTWIDTH] IN BLOOD BY AUTOMATED COUNT: 12.6 % (ref 10–15)
GFR SERPL CREATININE-BSD FRML MDRD: >90 ML/MIN/{1.73_M2}
GLUCOSE SERPL-MCNC: 78 MG/DL (ref 70–99)
HBA1C MFR BLD: 4.6 % (ref 0–5.6)
HCT VFR BLD AUTO: 42.6 % (ref 40–53)
HDLC SERPL-MCNC: 62 MG/DL
HGB BLD-MCNC: 14 G/DL (ref 13.3–17.7)
LDLC SERPL CALC-MCNC: 144 MG/DL
MCH RBC QN AUTO: 29.9 PG (ref 26.5–33)
MCHC RBC AUTO-ENTMCNC: 32.9 G/DL (ref 31.5–36.5)
MCV RBC AUTO: 91 FL (ref 78–100)
NONHDLC SERPL-MCNC: 167 MG/DL
PLATELET # BLD AUTO: 298 10E9/L (ref 150–450)
POTASSIUM SERPL-SCNC: 3.2 MMOL/L (ref 3.4–5.3)
PROT SERPL-MCNC: 7.7 G/DL (ref 6.8–8.8)
RBC # BLD AUTO: 4.69 10E12/L (ref 4.4–5.9)
SODIUM SERPL-SCNC: 140 MMOL/L (ref 133–144)
TRIGL SERPL-MCNC: 115 MG/DL
WBC # BLD AUTO: 6.1 10E9/L (ref 4–11)

## 2021-03-15 PROCEDURE — 84403 ASSAY OF TOTAL TESTOSTERONE: CPT | Performed by: FAMILY MEDICINE

## 2021-03-15 PROCEDURE — 80053 COMPREHEN METABOLIC PANEL: CPT | Performed by: FAMILY MEDICINE

## 2021-03-15 PROCEDURE — 36415 COLL VENOUS BLD VENIPUNCTURE: CPT | Performed by: FAMILY MEDICINE

## 2021-03-15 PROCEDURE — 80061 LIPID PANEL: CPT | Performed by: FAMILY MEDICINE

## 2021-03-15 PROCEDURE — 83036 HEMOGLOBIN GLYCOSYLATED A1C: CPT | Performed by: FAMILY MEDICINE

## 2021-03-15 PROCEDURE — 85027 COMPLETE CBC AUTOMATED: CPT | Performed by: FAMILY MEDICINE

## 2021-03-15 PROCEDURE — 99214 OFFICE O/P EST MOD 30 MIN: CPT | Mod: GC | Performed by: FAMILY MEDICINE

## 2021-03-15 RX ORDER — BUSPIRONE HYDROCHLORIDE 10 MG/1
10 TABLET ORAL DAILY
Qty: 90 TABLET | Refills: 1 | Status: SHIPPED | OUTPATIENT
Start: 2021-03-15 | End: 2021-09-07

## 2021-03-15 RX ORDER — SERTRALINE HYDROCHLORIDE 100 MG/1
150 TABLET, FILM COATED ORAL DAILY
Qty: 135 TABLET | Refills: 1 | Status: SHIPPED | OUTPATIENT
Start: 2021-03-15 | End: 2021-08-10

## 2021-03-15 RX ORDER — TESTOSTERONE 1.62 MG/G
2 GEL TRANSDERMAL DAILY
Qty: 150 G | Refills: 1 | Status: SHIPPED | OUTPATIENT
Start: 2021-03-15 | End: 2021-10-29

## 2021-03-15 RX ORDER — AZELASTINE 1 MG/ML
1-2 SPRAY, METERED NASAL 2 TIMES DAILY
Qty: 30 ML | Refills: 4 | Status: SHIPPED | OUTPATIENT
Start: 2021-03-15 | End: 2021-06-04

## 2021-03-15 NOTE — PROGRESS NOTES
Preceptor Attestation:   Patient seen and discussed with the resident. Assessment and plan reviewed with resident and agreed upon.   Supervising Physician:  DO [facility]

## 2021-03-15 NOTE — PATIENT INSTRUCTIONS
Benzoyl peroxide wash for acne    MyChart Username  [email or web address]       Individual Mental Health Practitioners   Therapist Children Adolescents Adults Family Other   Kathykierra Temple PsyD, LP  [address]  [place]  [address]  PH: [phone]   YES YES    Jerrica Doe, [facility]  [address]  [place] [address]  [email or web address]  [phone]   YES YES On Busline  Couples too   Caro Rascon PsyD, [facility]  [facility]  [address]  [place], [place]   PH:[phone] YES YES YES YES Business consulting   Fabián Michaels, PhD, LP  [address]  [place] [address]  PH: [phone]  [employer]  Older Adolescents YES  On busline   Willem Thompson, [facility]  [address]  [place] [address]  PH: [phone]  WillemDealer [employer]  YES YES YES Busline   Elvie Aparicio M.Ed, [facility], [facility]  [address] [place], [place]   [phone]  [email or web address]   YES YES  Experienced in Trans Veterans    Gissell Rivers MA, LMFT, CST  [address]  [place], [place] [address] [phone]  [facility]  YES YES YES Sex positive therapy    TONY Nunez MFA, PsKaylie, LP   [address]  [place], [place] [id number] [phone]  [email or web address]   YES YES Sexuality groups  [facility]        Mental Health Clinics   [facility]   Patients: Children, Teen, Adult, Families, Couples  [address]  [place] [address] [phone] fax  [facility] CenterAppSurfer    [facility]  Patients: Children, adolescents, adults  Locations:  [address]  [place] [address]  [place] [address] -[address]  [place] [address]  [place] [address]    Appointment Scheduling   [phone]  [email or web address]://www.Local MarketersCarondelet St. Joseph's HospitalcoCrown[facility] Care FacilitySHINE Medical Technologies.Bookmycab  [facility]  All ages- Also offers  in-home therapy and sliding fee  [address]  [place] [address]   Phone: [phone]  Meal Mantra.ResourceKraft    [facility] [place] [address]  Intake line [phone]  [email or web address]/programsservices/counseling/transgender-mental-health/    Transgender Mental Health Team  Therapists Children Adolescents Adults Family Other information   Kacie(Joel) Jaison Corona, MSW,LICSW YES YES YES  Thai and English  [facility] Location- [facility]   Brie Mora MA, LMFT YES YES YES  [place] Location   Ela Abdalla MSW, LICSW   YES YES EMDR  [place] Location     Yung Colin MA, LP YES YES YES YES [facility] Location- [facility] [address] [facility] Sexual medicine-Psychology  [email or web address]  [place] Location   [address] [place] [address]  Phone [phone]  Therapists Children Adolescents Adults Family Other   Libby Lyons PsyD, LP   YES YES-couples    Tristan Bruno PsyD, LP   YES     Michelle Vargas, PhD, LP   YES       [place] Location  [address] [place] [address]  Phone: [phone]  Therapists Children Adolescents Adults Family Other   Teri Rueda PsyD, LP   YES YES    Marline Watkins PsyD, LP   YES       [facility]  Patients-Queer and Trans Youth and Family counseling serving ages 12-26  [address] [place] [address]  Phone: [phone]  http://Reclaim.[facility]  [employer] Building  [address]  [place] [address]  Main line: [phone]  [email or web address]://[email or web address] [employer].ResourceKraft    [facility]/ Program in Human Sexuality  [address]  [place] [address]  PH: [phone]   [email or web address].[facility].edu/clinic-center-sexual-health/transgender-health-services    Therapists Children Adolescents Adults Family Other information   Mercedes Deshpande  Stephanie Sage YES YES YES     Eugenie Mata, PhD YES YES YES     Lien Herman PsyD  YES YES     Nanette Viveros, PhD YES YES YES     Ottoniel Morales, PhD, MPH YES YES   Sexual and Gender minority health   Multiple other Post-Doctoral fellows practicing at this location as well           [facility] Location: [address] [place] [address]  [place] Location: [address] [address]  Toll Free: [phone]  Phone: [phone]   [email or web address]://www.[facility]/therapeutic-services      Chemical Health Resources    [facility]  [address] [place] [address]  Phone: [phone] or toll free, [phone]  [email or web address]    [facility]  [address] [place] [address]  [phone]  [email or web address]/programs/residential/rxvkogqwq-isfp-qiinwaflnif-[facility]-[place]/    Additional Provider Directories  [facility]- Provider Directory for all services  http://www.[facility].org/resources-for-you/provider-directory/    [place]'s lesbian bergman bisexual transgender & allied mental health providers' network  http://www.lgbttherapists.org/    Provider Directory for Health Related resources in [place]  http://mnlgbtqdirectory.org    Directory for kink, Polyamory, gender non-conforming aware Providers  http://www.chadaware.org

## 2021-03-17 LAB — TESTOST SERPL-MCNC: 667 NG/DL (ref 240–950)

## 2021-03-21 SDOH — SOCIAL STABILITY: SOCIAL NETWORK: HOW OFTEN DO YOU GET TOGETHER WITH FRIENDS OR RELATIVES?: NOT ASKED

## 2021-03-21 SDOH — SOCIAL STABILITY: SOCIAL NETWORK: IN A TYPICAL WEEK, HOW MANY TIMES DO YOU TALK ON THE PHONE WITH FAMILY, FRIENDS, OR NEIGHBORS?: NOT ASKED

## 2021-03-21 SDOH — SOCIAL STABILITY: SOCIAL NETWORK: HOW OFTEN DO YOU ATTENT MEETINGS OF THE CLUB OR ORGANIZATION YOU BELONG TO?: NOT ASKED

## 2021-03-21 SDOH — SOCIAL STABILITY: SOCIAL NETWORK: HOW OFTEN DO YOU ATTEND CHURCH OR RELIGIOUS SERVICES?: NOT ASKED

## 2021-03-21 SDOH — SOCIAL STABILITY: SOCIAL NETWORK
DO YOU BELONG TO ANY CLUBS OR ORGANIZATIONS SUCH AS CHURCH GROUPS UNIONS, FRATERNAL OR ATHLETIC GROUPS, OR SCHOOL GROUPS?: NOT ASKED

## 2021-03-22 NOTE — ASSESSMENT & PLAN NOTE
Well controlled on azelastine.   Did allergen test a while ago that was + for dust mites, would like to start a new medication he's researched.  Placed referral to allergy for further evaluation and management.

## 2021-03-22 NOTE — ASSESSMENT & PLAN NOTE
Reports that it is mostly social anxiety, worsened recently by having to interact more over the phone at work. Not so much with gender ID since started hormones.   Refilled sertraline, buspirone, no changes in dose  Patient interested in therapy, concerned about insurance coverage/cost.   Provided list of trans-friendly therapists. Encouraged to contact insurance to enquire about this and possible gender-reaffirming surgery in the future.

## 2021-03-22 NOTE — ASSESSMENT & PLAN NOTE
Depressed mood well controlled, no SI currently. Here mostly to discussed worsened social anxiety.

## 2021-03-22 NOTE — ASSESSMENT & PLAN NOTE
Continuing testosterone at [facility] ([place] [facility], patient was not aware that we provided gender support care at our clinic). Doing labs today as not recently done at [facility]. Doing well on stable dose.   Refilled T as patient almost out.   Patient signing PEREZ for [facility] records.

## 2021-04-24 ENCOUNTER — HEALTH MAINTENANCE LETTER (OUTPATIENT)
Age: 26
End: 2021-04-24

## 2021-06-01 VITALS — WEIGHT: 142.1 LBS | HEIGHT: 66 IN | BODY MASS INDEX: 22.84 KG/M2

## 2021-06-01 VITALS — WEIGHT: 138 LBS | BODY MASS INDEX: 22.18 KG/M2 | HEIGHT: 66 IN

## 2021-06-03 DIAGNOSIS — J31.0 CHRONIC RHINITIS: Chronic | ICD-10-CM

## 2021-06-03 NOTE — TELEPHONE ENCOUNTER
"Request for medication refill:    Azelastine HCL     Providers if patient needs an appointment and you are willing to give a one month supply please refill for one month and  send a letter/MyChart using \".SMILLIMITEDREFILL\" .smillimited and route chart to \"P SMI \" (Giving one month refill in non controlled medications is strongly recommended before denial)    If refill has been denied, meaning absolutely no refills without visit, please complete the smart phrase \".smirxrefuse\" and route it to the \"P SMI MED REFILLS\"  pool to inform the patient and the pharmacy.    Unique Vargas, CMA        "

## 2021-06-04 RX ORDER — AZELASTINE 1 MG/ML
1-2 SPRAY, METERED NASAL 2 TIMES DAILY
Qty: 30 ML | Refills: 4 | Status: SHIPPED | OUTPATIENT
Start: 2021-06-04 | End: 2021-08-10

## 2021-06-18 NOTE — PROGRESS NOTES
Assessment:    Allergic rhinitis with specific sensitivity to dust mite    Plan:    Avoidance measures  Medication: Nasal spray medication such as Astelin 2 sprays each nostril morning and night  Consider adding Singulair 10 mg daily    Consider adding a Meliza pot nasal saline wash in the morning    Consider allergy shots.  Information given.  Jerson is a candidate.  ____________________________________________________________________________     Jerson comes in today with his mother for allergy evaluation.  He reports having allergies his whole life.  He describes nasal congestion, decreased sense of smell, headache symptoms, bad taste in his mouth.  No significant rhinorrhea.  No wheezing or shortness of breath.  He did see otolaryngology 7 8 years ago.  They prescribed Flonase and sinus irrigations.  A CT scan was done that is reportedly normal.  I do not have that medical report available today.  He has used antihistamines in the past with some benefit.  He is used Nasacort daily over the past 5 years.    Review of symptoms:  As above, otherwise negative    Past medical history: Attention deficit disorder, depression    Allergies: No known allergies to medications, latex, foods or hymenoptera venom    Family history: Brother with peanut allergy.  Mother with environmental allergies    Social history: Currently lives in house is built in 1895.  He has been in this house for 2 years.  There is a finished basement.  They do run a dehumidifier.  There is concern about mold in the basement.  They have cats and a dog in the home.  No significant cigarette smoke exposure.    Medications: reviewed in chart    Physical Exam:  General:  Alert and in no apparent distress.  Eyes:  Sclera clear.  Ears: TMs translucent grey with bony landmarks visible. Nose: Pale, boggy mucosal membranes.  Throat: Pink, moist.  No lesions.  Neck: Supple.  No lymphadenopathy.  Lungs: CTA.  CV: Regular rate and rhythm. Extremities: Well  perfused.  No clubbing or cyanosis. Skin: No rash    Last Percutaneous Allergy Test Results  Trees  Dante, White  1:20 H  (W/F in mm): 0/0 (05/30/18 1547)  Birch Mix 1:20 H (W/F in mm): 0/0 (05/30/18 1547)  Allen, Common 1:20 H (W/F in mm): 0/0 (05/30/18 1547)  Elm, American 1:20 H (W/F in mm): 0/0 (05/30/18 1547)  Bronx, Shagbark 1:20 H (W/F in mm): 0/0 (05/30/18 1547)  Maple, Hard/Sugar 1:20 H (W/F in mm): 0/0 (05/30/18 1547)  [place] Mix 1:20 H (W/F in mm): 0/0 (05/30/18 1547)  Normanna, Red 1:20 H (W/F in mm): 0/0 (05/30/18 1547)  Flat Rock, American 1:20 H (W/F in mm): 0/0 (05/30/18 1547)  Pickton Tree 1:20 H (W/F in mm): 0/0 (05/30/18 1547)  Dust Mites  D. Pteronyssinus Mite 30,000 AU/ML H (W/F in mm): 9/32 (05/30/18 1547)  D. Farinae Mite 30,000 AU/ML H (W/F in mm: 10/43 (05/30/18 1547)  Grasses  Grass Mix #4 10,000 BAU/ML H: 0/0 (05/30/18 1547)  Mando Grass 1:20 H (W/F in mm): 0/0 (05/30/18 1547)  Cockroach  Cockroach Mix 1:10 H (W/F in mm): 0/0 (05/30/18 1547)  Molds/Fungi  Alternaria Tenuis 1:10 H (W/F in mm): 0/0 (05/30/18 1547)  Aspergillus Fumigatus 1:10 H (W/F in mm): 0/0 (05/30/18 1547)  Homodendrum Cladosporioides 1:10 H (W/F in mm): 0/0 (05/30/18 1547)  Penicillin Notatum 1:10 H (W/F in mm): 0/0 (05/30/18 1547)  Epicoccum 1:10 H (W/F in mm): 0/0 (05/30/18 1547)  Weeds  Ragweed, Short 1:20 H (W/F in mm): 0/0 (05/30/18 1547)  Dock, Sorrel 1:20 H (W/F in mm): 0/0 (05/30/18 1547)  Lamb's Quarter 1:20 H (W/F in mm): 0/0 (05/30/18 1547)  Pigweed, Rough Red Root 1:20 H  (W/F in mm): 0/0 (05/30/18 1547)  Plantain, English 1:20 H  (W/F in mm): 0/0 (05/30/18 1547)  Sagebrush, Mugwort 1:20 H  (W/F in mm): 0/0 (05/30/18 1547)  Animal  Cat 10,000 BAU/ML H (W/F in mm): 0/0 (05/30/18 1547)  Dog 1:10 H (W/F in mm): 0/0 (05/30/18 1547)  Controls  Device Type: QUINTIP (05/30/18 1547)  Neg. control: 50% Glycerine/Saline H (W/F in mm): 0/0 (05/30/18 1547)  Pos. control: Histamine 6mg/ML (W/F in mms): 5/22 (05/30/18  2426)

## 2021-06-18 NOTE — PROGRESS NOTES
Assessment and Plan    1. Chronic rhinitis, unspecified type  Per his request - and perhaps to consider immune therapy  - Ambulatory referral to Allergy    2. Depression/3. Attention deficit hyperactivity disorder (ADHD), predominantly inattentive type  I did discuss that I was willing to address both as long as I could have the paperwork from his diagnosis of ADHD  - Ambulatory referral to Psychiatry    follow up soon - if they bring paperwork, I will have him sign CSA for Adderral. And will in the future do urine drug screen    I hope he will be able to return to [facility] for management of gender dysphoria, which is certainly contributing to his depression.    Over 30 minutes spent with this patient, over half in counseling: reviewing history, plans going forward, follow-up    Maria C Ramos MD     -------------------------------------------    Chief Complaint   Patient presents with     Establish Care     wants Tdap done.     Referral     for chronic sinisitus, wants to know what triggers it and also psychiatrist referral for depression.     Medication Management     use to take Adderall, last on it was in 2016.        Jerson comes with his mother today to establish care.  He had previously been seen in [facility] for [facility] dysphoric disorder - notes from [facility] trace his starting to feel different as early as age 6 or 7.  He says his provider at [facility] stopped seeing him because he wasn't taking medication regularly, thus compromising his care    He also has a history of ADHD and depression - ADHD was not treated in [facility] but his mother says they have notes form his diagnosis.  He had hoped to have surgery to remove his breasts, but his family lost insurance. His mother is a family physician and was prescribing Adder all and also anastrozole for him - she lost her licence.    During high school 10th grade he had depression, took off in 11th grade became worse - at that point it was largely  "issues with father who he says has narcissistic personality disorder and was horrible. He  started seeing  therapists - they were confused so about his diagnosis so they referred him for evaluation - had 3 day psychological testing - diagnosed with major depression with psychotics features and ADD mixed type.  Then he started medication trials. His mother says he has \" been on all of SSRIs\" - Paxil stopped working, switched to Zoloft - maybe improve treatment of depression? It was prescribe at 200 mg - at that time it was not any better managed.  He has also tried Venlafaxine, duloxetine. Has been on augmenting agents.     He had been going to [facility] but says he was \"suspended for mental health purposes\" - he didn't complete enough courses because of depression.  He has been taking Sertraline from a previous - not yet year old stash (not clear where this came from - mail order?).  He and his Mom felt he would benefit from a psychiatry referral for this and ADHD. He only issues with sertraline  Fatigue.    Little interest or pleasure in doing things: More than half the days  Feeling down, depressed, or hopeless: Not at all  Trouble falling or staying asleep, or sleeping too much: Nearly every day  Feeling tired or having little energy: More than half the days  Poor appetite or overeating: Several days  Feeling bad about yourself - or that you are a failure or have let yourself or your family down: Several days  Trouble concentrating on things, such as reading the newspaper or watching television: Several days  Moving or speaking so slowly that other people could have noticed. Or the opposite - being so fidgety or restless that you have been moving around a lot more than usual: Not at all  Thoughts that you would be better off dead, or of hurting yourself in some way: Not at all  PHQ-9 Total Score: 10    Has had ADD for as long as he can remember - last time he took something on an ongoing basis was 3 years " "ago. Never tried Strattera.  Adder all helped.  He used to smoke marijuana due to \"peer pressure\" but says he no longer does so.    I asked about his goals - he says \"I just want a schedule so I can take care of my basic needs so I can address my gender dysphoria.\" His mother recently contacted an ADD     Other issues:  1) chronic sinusitis due to allergies - he has trouble breathing through his nose when he was a baby - eventually took him into a ENT  In high school  - doctor did CT scan and said it was normal - prescribed a neti pot and steroid nasal spray for three months and that didn't work. Currently taking Nasacort enough so that he can sleep. Used antihistamines in the past because they didn't help. His main symptom is that he can't breath through nose and can't smell much of anything. He does not have eye issues with allergies)     2) Crackleisa lancaster' recently    Patient Active Problem List   Diagnosis     Attention deficit hyperactivity disorder (ADHD), predominantly inattentive type     Depression     Chronic rhinitis, unspecified type     Gender dysphoria in adolescent and adult         Current Outpatient Prescriptions:      sertraline (ZOLOFT) 100 MG tablet, Take 100 mg by mouth daily., Disp: , Rfl:      triamcinolone (NASACORT) 55 mcg nasal inhaler, 2 sprays into each nostril daily., Disp: , Rfl:         History   Smoking Status     Never Smoker   Smokeless Tobacco     Never Used       History   Alcohol Use     Yes     Comment: 0-6 drinks socially - at most 3 at once       Vitals:    05/22/18 1424   BP: 110/62   Pulse: 73   SpO2: 98%     Body mass index is 22.27 kg/(m^2).     EXAM:    General appearance - alert, well appearing, and in no distress and very nasal voice.  He looks and sound unquestionably male  Mental status -casually dressed.  During conversation he expressed a lot of frustration, was somewhat self deprecating and speech sprinkled with expletives. Constricted mood and affect, did " not appearing anxious, normal speech flow, normal though process

## 2021-08-09 NOTE — PROGRESS NOTES
"[facility] Note  [facility]     Assessment and Plan   Jerson Munoz is a 25 year old who presented to clinic today for the following issues:     [place] allergy  Chronic. Has previously been told he'd be a good candidate for allergy shots. Logistical error w/ allergy referral last time, so he requests a new referral, which I provided. In the meantime, continue using azelastine 1-2 sprays per nostril BID (refilled)).     Dermal nevus  Skin-colored dome-shaped papule w/ hair growth of posterior left parieto-occipital scalp stable in size for years, gets caught when brushing. Referral to derm for removal.     Skin lesion  Small skin-colored papule vermillion border superior lip just right of midline that gets in the way when shaving. Referred to derm for further eval.     MDD, Anxiety, Familial conflict   Mom's hoarding is still major point of stress. Jerson and mom have family therapy scheduled in about a month, which Jerson is looking forward to. No individual therapy yet; will offer again at next visit. Continue taking sertraline 150mg daily (refilled today) and buspar 10mg daily.     Masculinizing hormone therapy  Things stable overall on masculinizing therapy. Labs drawn 8/10 shwo normal LFTs, milldy elevated LDL at 135 (stable from Mar 2021), normal hemoglobin and glucose. Testosterone level pending. Will write refill of testosterone gel once that results. May increase dose if appropriate.     Patient would eventually like a referral for top surgery. Will offer at next visit.     Follow-up with me in 3 months re: masculinizing hormone therapy.          HPI     Patient goals today:   - Get referral for allergy shots  - Referral to derm for \"mole\" removal   - Left parieto-occipital scalp growth, gets caught while brushing hair  - Vermillion border papule (2-3mm), skin-colored, gets snagged while shaving  - Hormone therapy   - Anxiety and depression     Working overnights at a " shelter program. Going pretty well.   Went to a bit of college, but left for mental health reasons.   Lives with mom.   Nervous about pap today, but discussed options to make it less awful and patient knows that it's something worth doing eventually. Thinks it's fair to set a goal of trying to get it done within the next year.   Has never had penile-vaginal intercourse.   Has had nonconsensual receptive oral intercourse.   A bit interested in top surgery.   Feeling pretty good about mood overall. PHQ 9 score of 5, no SI/HI. NOAH 7 Is 3. Stressful living with mom sometimes because she's a hoarder and so after he gets home from working 4 days a week, he does a couple of days of work with cleaning up her stuff.   Jerson is trying to get into family therapy with mom-- they have therapy scheduled at the end of the month. Also have a  coming at the end of this week to help address that issue while waiting for therapy.  Mom's hoarding is a major source of stress.   Not currently sexually active or at all in the past year.   Jerson is a 25 year old individual that uses pronouns He/Him/His/Himself that presents today for follow up of:  masculinizing hormone therapy.   Hard to get to sleep because  A million things to do before being able to sleep. Once he tries to sleep, he can fall asleep without problem.     On hormones?  Topical testosterone 2 pumps daily.     Gender affirmation is being sought in these other ways: mom is supportive.     On testosterone since Nov 2020. Still getting periods monthly.     Current Outpatient Medications   Medication Sig Dispense Refill     azelastine (ASTELIN) 0.1 % nasal spray Spray 1-2 sprays into both nostrils 2 times daily 30 mL 4     busPIRone (BUSPAR) 10 MG tablet Take 1 tablet (10 mg) by mouth daily Take 1 tablet once a day 90 tablet 1     sertraline (ZOLOFT) 100 MG tablet Take 1.5 tablets (150 mg) by mouth daily . No more refills without an appointment. 135 tablet 1      "testosterone (ANDROGEL 1.62 % PUMP) 20.25 MG/ACT gel Place 2 Pump onto the skin daily Apply from dispenser to clean, dry, intact skin of the upper arms and shoulders. 150 g 1       History   Smoking Status     Never Smoker   Smokeless Tobacco     Never Used     ROS:        General    Fat redistribution: no    Weight change: lost a bit of weight HEENT    Voice change: yes     Cardiovascular (CV)    Chest Pains: no    Shortness of breath: no Chest    Decreased exercise tolerance:  no    Breast changes/development: no     Gastrointestinal (GI)    Abdominal pain: no    Change in appetite: no Skin    Acne or oily skin: yes, more body acne; jas on legs (not too bothersome)    Change in hair: yes, hair line is changing, which he's very happy about     Genitourinary ()    Abnormal vaginal bleeding: yes periods monthly      Change in libido: increased a bit    New sexual partners: no Musculoskeletal    Leg pain or swelling: no     Psychiatric (Psych)    Depression: yes    Anxiety/Panic: yes    Mood:  \"okay\"                 Physical exam:  1.1 x 0.8 cm flesh-colored outgrowth w/ hair growing. Stable for a couple of years.         "

## 2021-08-10 ENCOUNTER — OFFICE VISIT (OUTPATIENT)
Dept: FAMILY MEDICINE | Facility: CLINIC | Age: 26
End: 2021-08-10
Payer: COMMERCIAL

## 2021-08-10 VITALS
TEMPERATURE: 99.2 F | BODY MASS INDEX: 22.21 KG/M2 | RESPIRATION RATE: 16 BRPM | HEART RATE: 83 BPM | DIASTOLIC BLOOD PRESSURE: 83 MMHG | WEIGHT: 138.2 LBS | SYSTOLIC BLOOD PRESSURE: 128 MMHG | OXYGEN SATURATION: 98 % | HEIGHT: 66 IN

## 2021-08-10 DIAGNOSIS — F41.9 ANXIETY: Chronic | ICD-10-CM

## 2021-08-10 DIAGNOSIS — J31.0 CHRONIC RHINITIS: Chronic | ICD-10-CM

## 2021-08-10 DIAGNOSIS — L98.9 SKIN LESION: ICD-10-CM

## 2021-08-10 DIAGNOSIS — J30.89 SEASONAL ALLERGIC RHINITIS DUE TO OTHER ALLERGIC TRIGGER: Primary | ICD-10-CM

## 2021-08-10 DIAGNOSIS — J30.89 ALLERGIC RHINITIS DUE TO DUST MITE: ICD-10-CM

## 2021-08-10 DIAGNOSIS — F64.0 GENDER DYSPHORIA IN ADOLESCENT AND ADULT: Chronic | ICD-10-CM

## 2021-08-10 DIAGNOSIS — F33.1 MAJOR DEPRESSIVE DISORDER, RECURRENT EPISODE, MODERATE (H): Chronic | ICD-10-CM

## 2021-08-10 LAB
ALBUMIN SERPL-MCNC: 4.1 G/DL (ref 3.4–5)
ALP SERPL-CCNC: 61 U/L (ref 40–150)
ALT SERPL W P-5'-P-CCNC: 20 U/L (ref 0–70)
AST SERPL W P-5'-P-CCNC: 15 U/L (ref 0–45)
BILIRUB DIRECT SERPL-MCNC: 0.1 MG/DL (ref 0–0.2)
BILIRUB SERPL-MCNC: 0.5 MG/DL (ref 0.2–1.3)
CHOLEST SERPL-MCNC: 214 MG/DL
FASTING STATUS PATIENT QL REPORTED: NO
FASTING STATUS PATIENT QL REPORTED: NO
GLUCOSE BLD-MCNC: 76 MG/DL (ref 70–99)
HDLC SERPL-MCNC: 58 MG/DL
HGB BLD-MCNC: 13.8 G/DL
LDLC SERPL CALC-MCNC: 135 MG/DL
NONHDLC SERPL-MCNC: 156 MG/DL
PROT SERPL-MCNC: 7.3 G/DL (ref 6.8–8.8)
TRIGL SERPL-MCNC: 104 MG/DL

## 2021-08-10 PROCEDURE — 84403 ASSAY OF TOTAL TESTOSTERONE: CPT | Mod: KX | Performed by: STUDENT IN AN ORGANIZED HEALTH CARE EDUCATION/TRAINING PROGRAM

## 2021-08-10 PROCEDURE — 85018 HEMOGLOBIN: CPT | Performed by: STUDENT IN AN ORGANIZED HEALTH CARE EDUCATION/TRAINING PROGRAM

## 2021-08-10 PROCEDURE — 36415 COLL VENOUS BLD VENIPUNCTURE: CPT | Performed by: STUDENT IN AN ORGANIZED HEALTH CARE EDUCATION/TRAINING PROGRAM

## 2021-08-10 PROCEDURE — 82947 ASSAY GLUCOSE BLOOD QUANT: CPT | Performed by: STUDENT IN AN ORGANIZED HEALTH CARE EDUCATION/TRAINING PROGRAM

## 2021-08-10 PROCEDURE — 80061 LIPID PANEL: CPT | Performed by: STUDENT IN AN ORGANIZED HEALTH CARE EDUCATION/TRAINING PROGRAM

## 2021-08-10 PROCEDURE — 80076 HEPATIC FUNCTION PANEL: CPT | Performed by: STUDENT IN AN ORGANIZED HEALTH CARE EDUCATION/TRAINING PROGRAM

## 2021-08-10 PROCEDURE — 99214 OFFICE O/P EST MOD 30 MIN: CPT | Performed by: STUDENT IN AN ORGANIZED HEALTH CARE EDUCATION/TRAINING PROGRAM

## 2021-08-10 PROCEDURE — 84270 ASSAY OF SEX HORMONE GLOBUL: CPT | Mod: KX | Performed by: STUDENT IN AN ORGANIZED HEALTH CARE EDUCATION/TRAINING PROGRAM

## 2021-08-10 RX ORDER — AZELASTINE 1 MG/ML
1-2 SPRAY, METERED NASAL 2 TIMES DAILY
Qty: 30 ML | Refills: 4 | Status: SHIPPED | OUTPATIENT
Start: 2021-08-10 | End: 2021-09-08

## 2021-08-10 RX ORDER — SERTRALINE HYDROCHLORIDE 100 MG/1
150 TABLET, FILM COATED ORAL DAILY
Qty: 135 TABLET | Refills: 3 | Status: SHIPPED | OUTPATIENT
Start: 2021-08-10 | End: 2021-11-08

## 2021-08-10 ASSESSMENT — ANXIETY QUESTIONNAIRES
2. NOT BEING ABLE TO STOP OR CONTROL WORRYING: NOT AT ALL
GAD7 TOTAL SCORE: 5
7. FEELING AFRAID AS IF SOMETHING AWFUL MIGHT HAPPEN: NOT AT ALL
6. BECOMING EASILY ANNOYED OR IRRITABLE: SEVERAL DAYS
3. WORRYING TOO MUCH ABOUT DIFFERENT THINGS: SEVERAL DAYS
5. BEING SO RESTLESS THAT IT IS HARD TO SIT STILL: SEVERAL DAYS
1. FEELING NERVOUS, ANXIOUS, OR ON EDGE: SEVERAL DAYS
IF YOU CHECKED OFF ANY PROBLEMS ON THIS QUESTIONNAIRE, HOW DIFFICULT HAVE THESE PROBLEMS MADE IT FOR YOU TO DO YOUR WORK, TAKE CARE OF THINGS AT HOME, OR GET ALONG WITH OTHER PEOPLE: SOMEWHAT DIFFICULT

## 2021-08-10 ASSESSMENT — PATIENT HEALTH QUESTIONNAIRE - PHQ9
SUM OF ALL RESPONSES TO PHQ QUESTIONS 1-9: 3
5. POOR APPETITE OR OVEREATING: SEVERAL DAYS

## 2021-08-10 ASSESSMENT — MIFFLIN-ST. JEOR: SCORE: 1551.24

## 2021-08-10 NOTE — PATIENT INSTRUCTIONS
Patient Education   Here is the plan from today's visit    - Placed derm referral to evaluate your two skin lesions. I think they're both benign, but they'll probably remove the lesions since they're irritating to you.   - Placed allergy referral  - Labs look okay for gender therapy. Testosterone level is pending still. I'll prescribe your testosterone refills after testosterone lab comes back so I can choose the correct dose.   - I'm happy to help you with navigating the process for top surgery-- let's talk about that next time  - Continue buspar and zoloft for anxiety and depression. Good luck with family therapy. Good job for taking that important step!!     Follow-up in 3 months, or sooner as needed.     Thank you for coming to [facility] today.  COVID-19 Vaccine:  Starting Monday 8/2/21: [facility] will have walk-in appointments for Moderna, Pfizer and Mando&Mando vaccines. No appointment needed! You will also have the option of receiving Moderna vaccine during your physician appointment. Please ask your care team for more information!  You may be eligible for a $100 Visa giftcard if you receive your first dose between Friday July 30th and Sunday August 15th. Visit https://[place].gov/covid19/100/ for more information.   Lab Testing:  **If you had lab testing today and your results are reassuring or normal they will be mailed to you or sent through [facility] within 7 days.   **If the lab tests need quick action we will call you with the results.  **If you are having labs done on a different day, please call [phone] to schedule at [facility] or [phone] for other [facility] locations.   The phone number we will call with results is # [phone] (home) . If this is not the best number please call our clinic and change the number.  Medication Refills:  If you need any refills please call your pharmacy and they will contact us.   If you need to  your refill at a  new pharmacy, please contact the new pharmacy directly. The new pharmacy will help you get your medications transferred faster.   Scheduling:  If you have any concerns about today's visit or wish to schedule another appointment please call our office during normal business hours [phone] (8-5:00 M-F)  If a referral was made to a [facility] and you don't get a call from central scheduling please call [phone].  If a Mammogram was ordered for you at [facility] call [phone] to schedule or change your appointment.  If you had an EKG/XRay/CT/Ultrasound/MRI ordered the number is [phone] to schedule or change your radiology appointment.   Medical Concerns:  If you have urgent medical concerns please call [phone] at any time of the day.    Hanna Brand MD

## 2021-08-11 ASSESSMENT — ANXIETY QUESTIONNAIRES: GAD7 TOTAL SCORE: 5

## 2021-08-11 NOTE — PROGRESS NOTES
Preceptor Attestation:   Patient seen, evaluated and discussed with the resident. I have verified the content of the note, which accurately reflects my assessment of the patient and the plan of care.   Supervising Physician:  Sarthak Perry MD

## 2021-08-12 LAB
SHBG SERPL-SCNC: 69 NMOL/L (ref 11–135)
TESTOST FREE SERPL-MCNC: 3.5 NG/DL
TESTOST SERPL-MCNC: 297 NG/DL (ref 8–950)

## 2021-09-07 DIAGNOSIS — J31.0 CHRONIC RHINITIS: Chronic | ICD-10-CM

## 2021-09-07 DIAGNOSIS — F33.1 MAJOR DEPRESSIVE DISORDER, RECURRENT EPISODE, MODERATE (H): Chronic | ICD-10-CM

## 2021-09-07 RX ORDER — AZELASTINE 1 MG/ML
1-2 SPRAY, METERED NASAL 2 TIMES DAILY
Qty: 30 ML | Refills: 4 | Status: CANCELLED | OUTPATIENT
Start: 2021-09-07

## 2021-09-07 RX ORDER — BUSPIRONE HYDROCHLORIDE 10 MG/1
10 TABLET ORAL DAILY
Qty: 90 TABLET | Refills: 1 | Status: SHIPPED | OUTPATIENT
Start: 2021-09-07 | End: 2022-12-22

## 2021-09-07 NOTE — TELEPHONE ENCOUNTER
"Request for medication refill:azelastine (ASTELIN) 0.1 % nasal spray    Providers if patient needs an appointment and you are willing to give a one month supply please refill for one month and  send a letter/MyChart using \".SMILLIMITEDREFILL\" .smillimited and route chart to \"P [facility] \" (Giving one month refill in non controlled medications is strongly recommended before denial)    If refill has been denied, meaning absolutely no refills without visit, please complete the smart phrase \".smirxrefuse\" and route it to the \"P [facility] MED REFILLS\"  pool to inform the patient and the pharmacy.    Rachelle James        "

## 2021-09-07 NOTE — TELEPHONE ENCOUNTER
"Request for medication refill:  busPIRone (BUSPAR) 10 MG tablet  Azelastine 0.1% solution    Providers if patient needs an appointment and you are willing to give a one month supply please refill for one month and  send a letter/MyChart using \".SMILLIMITEDREFILL\" .smillimited and route chart to \"P [facility] \" (Giving one month refill in non controlled medications is strongly recommended before denial)    If refill has been denied, meaning absolutely no refills without visit, please complete the smart phrase \".smirxrefuse\" and route it to the \"P [facility] MED REFILLS\"  pool to inform the patient and the pharmacy.    Annalise Hyatt MA        "

## 2021-09-08 RX ORDER — AZELASTINE 1 MG/ML
1-2 SPRAY, METERED NASAL 2 TIMES DAILY
Qty: 30 ML | Refills: 4 | Status: SHIPPED | OUTPATIENT
Start: 2021-09-08 | End: 2024-09-11

## 2021-10-04 ENCOUNTER — HEALTH MAINTENANCE LETTER (OUTPATIENT)
Age: 26
End: 2021-10-04

## 2021-10-12 ENCOUNTER — OFFICE VISIT (OUTPATIENT)
Dept: DERMATOLOGY | Facility: CLINIC | Age: 26
End: 2021-10-12
Payer: COMMERCIAL

## 2021-10-12 VITALS — SYSTOLIC BLOOD PRESSURE: 127 MMHG | DIASTOLIC BLOOD PRESSURE: 80 MMHG | OXYGEN SATURATION: 94 % | HEART RATE: 73 BPM

## 2021-10-12 DIAGNOSIS — D22.9 MULTIPLE BENIGN NEVI: Primary | ICD-10-CM

## 2021-10-12 DIAGNOSIS — D48.5 NEOPLASM OF UNCERTAIN BEHAVIOR OF SKIN: ICD-10-CM

## 2021-10-12 PROCEDURE — 11103 TANGNTL BX SKIN EA SEP/ADDL: CPT | Performed by: PHYSICIAN ASSISTANT

## 2021-10-12 PROCEDURE — 99203 OFFICE O/P NEW LOW 30 MIN: CPT | Mod: 25 | Performed by: PHYSICIAN ASSISTANT

## 2021-10-12 PROCEDURE — 11102 TANGNTL BX SKIN SINGLE LES: CPT | Performed by: PHYSICIAN ASSISTANT

## 2021-10-12 PROCEDURE — 88305 TISSUE EXAM BY PATHOLOGIST: CPT | Performed by: PATHOLOGY

## 2021-10-12 NOTE — PATIENT INSTRUCTIONS
Wound Care Instructions     FOR SUPERFICIAL WOUNDS     AFTER 24 HOURS YOU SHOULD REMOVE THE BANDAGE AND BEGIN DAILY DRESSING CHANGES AS FOLLOWS:     1) Remove Dressing.     2) Clean and dry the area with tap water using a Q-tip or sterile gauze pad.     3) Apply Polysporin ointment or Bacitracin ointment over entire wound.  Do NOT use Neosporin ointment.     4) Cover the wound with a band-aid, or a sterile non-stick gauze pad and micropore paper tape      REPEAT THESE INSTRUCTIONS AT LEAST ONCE A DAY UNTIL THE WOUND HAS COMPLETELY HEALED.    It is an old wives tale that a wound heals better when it is exposed to air and allowed to dry out. The wound will heal faster with a better cosmetic result if it is kept moist with ointment and covered with a bandage.    **Do not let the wound dry out.**      Supplies Needed:      *Cotton tipped applicators (Q-tips)    *Polysporin Ointment or Bacitracin Ointment (NOT NEOSPORIN)    *Band-aids or non-stick gauze pads and micropore paper tape.    PATIENT INFORMATION:    During the healing process you will notice a number of changes. All wounds develop a small halo of redness surrounding the wound.  This means healing is occurring. Severe itching with extensive redness usually indicates sensitivity to the ointment or bandage tape used to dress the wound.  You should call our office if this develops.      Swelling  and/or discoloration around your surgical site is common, particularly when performed around the eye.    All wounds normally drain.  The larger the wound the more drainage there will be.  After 7-10 days, you will notice the wound beginning to shrink and new skin will begin to grow.  The wound is healed when you can see skin has formed over the entire area.  A healed wound has a healthy, shiny look to the surface and is red to dark pink in color to normalize.  Wounds may take approximately 4-6 weeks to heal.  Larger wounds may take 6-8 weeks.  After the wound is healed  you may discontinue dressing changes.    You may experience a sensation of tightness as your wound heals. This is normal and will gradually subside.    Your healed wound may be sensitive to temperature changes. This sensitivity improves with time, but if you re having a lot of discomfort, try to avoid temperature extremes.    Patients frequently experience itching after their wound appears to have healed because of the continue healing under the skin.  Plain Vaseline will help relieve the itching.    BLEEDIN. Leave the bandage in place.  2. Use tightly rolled up gauze or a cloth to apply direct pressure over the bandage for 20 minutes.  3. Reapply pressure for an additional 20 minutes if necessary  4. Call the office or go to the nearest emergency room if pressure fails to stop the bleeding.  5. Use additional gauze and tape to maintain pressure once the bleeding has stopped.  6. If pressure alone does not stop the bleeding, call the Dermatology Clinic at [phone] or go to the nearest Emergency room.

## 2021-10-12 NOTE — TELEPHONE ENCOUNTER
FUTURE VISIT INFORMATION      FUTURE VISIT INFORMATION:    Date: 11.23.21    Time: 8 AM    Location:  Allergy  REFERRAL INFORMATION:    Referring provider:  Sunday    Referring providers clinic:  [facility]    Reason for visit/diagnosis  Seasonal allergic rhinitis due to other allergic     RECORDS REQUESTED FROM:       Clinic name Comments Records Status Imaging Status   [facility] 8.10.21 Sunday [facility]

## 2021-10-12 NOTE — NURSING NOTE
Chief Complaint   Patient presents with     Derm Problem     Mole check        Vitals:    10/12/21 1308   BP: 127/80   BP Location: Left arm   Patient Position: Sitting   Cuff Size: Adult Regular   Pulse: 73   SpO2: 94%     Wt Readings from Last 1 Encounters:   08/10/21 62.7 kg (138 lb 3.2 oz)       Marline Jimenez LPN .................10/12/2021

## 2021-10-12 NOTE — LETTER
10/12/2021         RE: Jerson Munoz  [address] [place] [address]        Dear Colleague,    Thank you for referring your patient, Jerson Munoz, to the [facility]. Please see a copy of my visit note below.    Jerson Munoz is an extremely pleasant 26 year old year old adult patient here today for moles on face and scalp. Present for years. He had spot shaved off on nose, and returned. He notes spots get irritated with shaving and getting haircut.   Patient has no other skin complaints today.  Remainder of the HPI, Meds, PMH, Allergies, FH, and SH was reviewed in chart.    Pertinent Hx:  No personal history of skin cancer.   Past Medical History:   Diagnosis Date     Attention deficit hyperactivity disorder (ADHD), combined type 1/27/2016     Migraines        Past Surgical History:   Procedure Laterality Date     HC TOOTH EXTRACTION W/FORCEP       WISDOM TOOTH EXTRACTION          Family History   Problem Relation Age of Onset     Depression Mother      Glaucoma Mother      Insulin Resistance Mother      Mental Illness Father      Sleep Apnea Father      Heart Disease Paternal Grandfather      Mental Illness Sister      Supraventricular tachycardia Brother      Myocardial Infarction Maternal Grandfather      No Known Problems Paternal Grandmother      No Known Problems Sister      Cerebrovascular Disease Maternal Cousin      Myocardial Infarction Maternal Cousin      Diabetes Maternal Cousin        Social History     Socioeconomic History     Marital status: Single     Spouse name: Not on file     Number of children: Not on file     Years of education: Not on file     Highest education level: Not on file   Occupational History     Not on file   Tobacco Use     Smoking status: Never Smoker     Smokeless tobacco: Never Used   Substance and Sexual Activity     Alcohol use: Yes     Comment: social drinker, soju      Drug use: Yes     Types: Marijuana     Comment: never IVDU      Sexual activity: Not Currently     Partners: Male   Other Topics Concern     Not on file   Social History Narrative     Not on file     Social Determinants of Health     Financial Resource Strain:      Difficulty of Paying Living Expenses:    Food Insecurity:      Worried About Running Out of Food in the Last Year:      Ran Out of Food in the Last Year:    Transportation Needs:      Lack of Transportation (Medical):      Lack of Transportation (Non-Medical):    Physical Activity:      Days of Exercise per Week:      Minutes of Exercise per Session:    Stress:      Feeling of Stress :    Social Connections: Unknown     Frequency of Communication with Friends and Family: Not asked     Frequency of Social Gatherings with Friends and Family: Not asked     Attends Restorationism Services: Not asked     Active Member of Clubs or Organizations: Not asked     Attends Club or Organization Meetings: Not asked     Marital Status: Not on file   Intimate Partner Violence: Not At Risk     Fear of Current or Ex-Partner: No     Emotionally Abused: No     Physically Abused: No     Sexually Abused: No       Outpatient Encounter Medications as of 10/12/2021   Medication Sig Dispense Refill     azelastine (ASTELIN) 0.1 % nasal spray Spray 1-2 sprays into both nostrils 2 times daily 30 mL 4     busPIRone (BUSPAR) 10 MG tablet Take 1 tablet (10 mg) by mouth daily Take 1 tablet once a day 90 tablet 1     sertraline (ZOLOFT) 100 MG tablet Take 1.5 tablets (150 mg) by mouth daily . No more refills without an appointment. 135 tablet 3     testosterone (ANDROGEL 1.62 % PUMP) 20.25 MG/ACT gel Place 2 Pump onto the skin daily Apply from dispenser to clean, dry, intact skin of the upper arms and shoulders. 150 g 1     No facility-administered encounter medications on file as of 10/12/2021.             O:   NAD, WDWN, Alert & Oriented, Mood & Affect wnl, Vitals stable   Here today alone   /80 (BP Location: Left arm, Patient Position: Sitting,  Cuff Size: Adult Regular)   Pulse 73   SpO2 94%    General appearance normal   Vitals stable   Alert, oriented and in no acute distress     0.9 cm skin colored papule on left occipital scalp  0.4 cm skin colored papule on right nasal tip  0.3 cm skin colored papule on right upper cutaneous lip   Brown papules with normal pigment network and border on right ear      The remainder of skin exam is normal     Eyes: Conjunctivae/lids:Normal     ENT: Lips,: normal    MSK:Normal    Pulm: Breathing Normal    Neuro/Psych: Orientation:Alert and Orientedx3 ; Mood/Affect:normal     A/P:  1. R/O intradermal nevus on right nasal tip, right upper cutaneous lip, and left occipital scalp   Discussed risk of recurrence and scar. If returning and wanting to do a full excision can schedule with Dr. Dennis.   TANGENTIAL BIOPSY SENT OUT:  After consent, anesthesia with LEC and prep, tangential excision performed and specimen sent out for permanent section histology.  No complications and routine wound care. Patient told to call our office in 1-2 weeks for result.      2. Melanocytic nevus on right ear   BENIGN LESIONS DISCUSSED WITH PATIENT:  I discussed the specifics of tumor, prognosis, and genetics of benign lesions.  I explained that treatment of these lesions would be purely cosmetic and not medically neccessary.  I discussed with patient different removal options including excision, cautery and /or laser.      Nature and genetics of benign skin lesions dicussed with patient.  Signs and Symptoms of skin cancer discussed with patient.  ABCDEs of melanoma reviewed with patient.  Patient encouraged to perform monthly skin exams.  UV precautions reviewed with patient.   Risks of non-melanoma skin cancer discussed with patient   Return to clinic in one year or sooner if needed.           Again, thank you for allowing me to participate in the care of your patient.        Sincerely,        Daria Solano PA-C

## 2021-10-12 NOTE — PROGRESS NOTES
Jerson Munoz is an extremely pleasant 26 year old year old adult patient here today for moles on face and scalp. Present for years. He had spot shaved off on nose, and returned. He notes spots get irritated with shaving and getting haircut.   Patient has no other skin complaints today.  Remainder of the HPI, Meds, PMH, Allergies, FH, and SH was reviewed in chart.    Pertinent Hx:  No personal history of skin cancer.   Past Medical History:   Diagnosis Date     Attention deficit hyperactivity disorder (ADHD), combined type 1/27/2016     Migraines        Past Surgical History:   Procedure Laterality Date     HC TOOTH EXTRACTION W/FORCEP       WISDOM TOOTH EXTRACTION          Family History   Problem Relation Age of Onset     Depression Mother      Glaucoma Mother      Insulin Resistance Mother      Mental Illness Father      Sleep Apnea Father      Heart Disease Paternal Grandfather      Mental Illness Sister      Supraventricular tachycardia Brother      Myocardial Infarction Maternal Grandfather      No Known Problems Paternal Grandmother      No Known Problems Sister      Cerebrovascular Disease Maternal Cousin      Myocardial Infarction Maternal Cousin      Diabetes Maternal Cousin        Social History     Socioeconomic History     Marital status: Single     Spouse name: Not on file     Number of children: Not on file     Years of education: Not on file     Highest education level: Not on file   Occupational History     Not on file   Tobacco Use     Smoking status: Never Smoker     Smokeless tobacco: Never Used   Substance and Sexual Activity     Alcohol use: Yes     Comment: social drinker, soju      Drug use: Yes     Types: Marijuana     Comment: never IVDU     Sexual activity: Not Currently     Partners: Male   Other Topics Concern     Not on file   Social History Narrative     Not on file     Social Determinants of Health     Financial Resource Strain:      Difficulty of Paying Living Expenses:    Food  Insecurity:      Worried About Running Out of Food in the Last Year:      Ran Out of Food in the Last Year:    Transportation Needs:      Lack of Transportation (Medical):      Lack of Transportation (Non-Medical):    Physical Activity:      Days of Exercise per Week:      Minutes of Exercise per Session:    Stress:      Feeling of Stress :    Social Connections: Unknown     Frequency of Communication with Friends and Family: Not asked     Frequency of Social Gatherings with Friends and Family: Not asked     Attends Restoration Services: Not asked     Active Member of Clubs or Organizations: Not asked     Attends Club or Organization Meetings: Not asked     Marital Status: Not on file   Intimate Partner Violence: Not At Risk     Fear of Current or Ex-Partner: No     Emotionally Abused: No     Physically Abused: No     Sexually Abused: No       Outpatient Encounter Medications as of 10/12/2021   Medication Sig Dispense Refill     azelastine (ASTELIN) 0.1 % nasal spray Spray 1-2 sprays into both nostrils 2 times daily 30 mL 4     busPIRone (BUSPAR) 10 MG tablet Take 1 tablet (10 mg) by mouth daily Take 1 tablet once a day 90 tablet 1     sertraline (ZOLOFT) 100 MG tablet Take 1.5 tablets (150 mg) by mouth daily . No more refills without an appointment. 135 tablet 3     testosterone (ANDROGEL 1.62 % PUMP) 20.25 MG/ACT gel Place 2 Pump onto the skin daily Apply from dispenser to clean, dry, intact skin of the upper arms and shoulders. 150 g 1     No facility-administered encounter medications on file as of 10/12/2021.             O:   NAD, WDWN, Alert & Oriented, Mood & Affect wnl, Vitals stable   Here today alone   /80 (BP Location: Left arm, Patient Position: Sitting, Cuff Size: Adult Regular)   Pulse 73   SpO2 94%    General appearance normal   Vitals stable   Alert, oriented and in no acute distress     0.9 cm skin colored papule on left occipital scalp  0.4 cm skin colored papule on right nasal tip  0.3 cm  skin colored papule on right upper cutaneous lip   Brown papules with normal pigment network and border on right ear      The remainder of skin exam is normal     Eyes: Conjunctivae/lids:Normal     ENT: Lips,: normal    MSK:Normal    Pulm: Breathing Normal    Neuro/Psych: Orientation:Alert and Orientedx3 ; Mood/Affect:normal     A/P:  1. R/O intradermal nevus on right nasal tip, right upper cutaneous lip, and left occipital scalp   Discussed risk of recurrence and scar. If returning and wanting to do a full excision can schedule with Dr. Dennis.   TANGENTIAL BIOPSY SENT OUT:  After consent, anesthesia with LEC and prep, tangential excision performed and specimen sent out for permanent section histology.  No complications and routine wound care. Patient told to call our office in 1-2 weeks for result.      2. Melanocytic nevus on right ear   BENIGN LESIONS DISCUSSED WITH PATIENT:  I discussed the specifics of tumor, prognosis, and genetics of benign lesions.  I explained that treatment of these lesions would be purely cosmetic and not medically neccessary.  I discussed with patient different removal options including excision, cautery and /or laser.      Nature and genetics of benign skin lesions dicussed with patient.  Signs and Symptoms of skin cancer discussed with patient.  ABCDEs of melanoma reviewed with patient.  Patient encouraged to perform monthly skin exams.  UV precautions reviewed with patient.   Risks of non-melanoma skin cancer discussed with patient   Return to clinic in one year or sooner if needed.

## 2021-10-14 LAB
PATH REPORT.COMMENTS IMP SPEC: NORMAL
PATH REPORT.FINAL DX SPEC: NORMAL
PATH REPORT.GROSS SPEC: NORMAL
PATH REPORT.MICROSCOPIC SPEC OTHER STN: NORMAL
PATH REPORT.RELEVANT HX SPEC: NORMAL

## 2021-10-27 DIAGNOSIS — F64.0 GENDER DYSPHORIA IN ADOLESCENT AND ADULT: Chronic | ICD-10-CM

## 2021-10-27 NOTE — TELEPHONE ENCOUNTER
"Request for medication refill:  testosterone (ANDROGEL 1.62 % PUMP) 20.25 MG/ACT gel  Providers if patient needs an appointment and you are willing to give a one month supply please refill for one month and  send a letter/MyChart using \".SMILLIMITEDREFILL\" .smillimited and route chart to \"[facility] \" (Giving one month refill in non controlled medications is strongly recommended before denial)    If refill has been denied, meaning absolutely no refills without visit, please complete the smart phrase \".smirxrefuse\" and route it to the \"[facility] MED REFILLS\"  pool to inform the patient and the pharmacy.    Hilary Barnes        "

## 2021-10-29 DIAGNOSIS — F64.0 GENDER DYSPHORIA IN ADOLESCENT AND ADULT: Chronic | ICD-10-CM

## 2021-10-29 RX ORDER — TESTOSTERONE 1.62 MG/G
2 GEL TRANSDERMAL DAILY
Qty: 150 G | Refills: 1 | OUTPATIENT
Start: 2021-10-29

## 2021-10-29 NOTE — TELEPHONE ENCOUNTER
Verify that the refill encounter hasn't been started Yes    [facility] phone call message- patient requesting a refill:    Full Medication Name: testosterone (ANDROGEL 1.62 % PUMP) 20.25 MG/ACT gel    Dose:      Pharmacy confirmed as   [id number] - [place], [place] - [address] AT [facility] [address]  [address] [facility] [address]  Phone: [phone] Fax: [phone]    [id number] - [place] - [address] [facility] [address]  [address] [place] [address]  Phone: [phone] Fax: [phone]    [address] [place] [address]  Phone: [phone] Fax: [phone]  : Yes    Medication tab checked to see if medication has been sent  Yes    Additional Comments: patient request refill out of med's  [facility]     OK to leave a message on voice mail? Yes    Advised patient refill may take up to 2 business days? Yes    Primary language: English      needed? No    Call taken on October 29, 2021 at 12:00 PM by HUNTER Marley    Route to [facility] MED REFILL

## 2021-10-29 NOTE — TELEPHONE ENCOUNTER
Patient calling for refill of tesosterone gel. Last office visit 08/10/21 with Dr. Brand. RN unable to fill as medication is controlled substance. Routing high priority to PCP to fill if appropriate.   Eunice Wilson, RN

## 2021-10-31 RX ORDER — TESTOSTERONE 1.62 MG/G
2 GEL TRANSDERMAL DAILY
Qty: 150 G | Refills: 1 | Status: SHIPPED | OUTPATIENT
Start: 2021-10-31 | End: 2021-11-11

## 2021-11-01 NOTE — TELEPHONE ENCOUNTER
Refill approved. Reviewed my Aug 10, 2021 note. Has visit scheduled w/ me on 11/11.     Hanna Brand MD

## 2021-11-08 DIAGNOSIS — F41.9 ANXIETY: Chronic | ICD-10-CM

## 2021-11-08 DIAGNOSIS — F33.1 MAJOR DEPRESSIVE DISORDER, RECURRENT EPISODE, MODERATE (H): Chronic | ICD-10-CM

## 2021-11-08 RX ORDER — SERTRALINE HYDROCHLORIDE 100 MG/1
150 TABLET, FILM COATED ORAL DAILY
Qty: 135 TABLET | Refills: 3 | Status: SHIPPED | OUTPATIENT
Start: 2021-11-08 | End: 2024-09-11

## 2021-11-08 NOTE — TELEPHONE ENCOUNTER
"Request for medication refill:  sertraline (ZOLOFT) 100 MG tablet  Providers if patient needs an appointment and you are willing to give a one month supply please refill for one month and  send a letter/MyChart using \".SMILLIMITEDREFILL\" .smillimited and route chart to \"[facility] \" (Giving one month refill in non controlled medications is strongly recommended before denial)    If refill has been denied, meaning absolutely no refills without visit, please complete the smart phrase \".smirxrefuse\" and route it to the \"[facility] MED REFILLS\"  pool to inform the patient and the pharmacy.    Corinne Lim        "

## 2021-11-11 ENCOUNTER — TELEPHONE (OUTPATIENT)
Dept: PLASTIC SURGERY | Facility: CLINIC | Age: 26
End: 2021-11-11

## 2021-11-11 ENCOUNTER — OFFICE VISIT (OUTPATIENT)
Dept: FAMILY MEDICINE | Facility: CLINIC | Age: 26
End: 2021-11-11
Payer: COMMERCIAL

## 2021-11-11 VITALS
RESPIRATION RATE: 16 BRPM | DIASTOLIC BLOOD PRESSURE: 85 MMHG | WEIGHT: 139.2 LBS | BODY MASS INDEX: 22.19 KG/M2 | SYSTOLIC BLOOD PRESSURE: 123 MMHG | TEMPERATURE: 98.6 F | HEART RATE: 68 BPM | OXYGEN SATURATION: 97 %

## 2021-11-11 DIAGNOSIS — F33.1 MAJOR DEPRESSIVE DISORDER, RECURRENT EPISODE, MODERATE (H): Chronic | ICD-10-CM

## 2021-11-11 DIAGNOSIS — F90.2 ATTENTION DEFICIT HYPERACTIVITY DISORDER (ADHD), COMBINED TYPE: ICD-10-CM

## 2021-11-11 DIAGNOSIS — N64.3 GALACTORRHEA: ICD-10-CM

## 2021-11-11 DIAGNOSIS — F64.0 GENDER DYSPHORIA IN ADOLESCENT AND ADULT: Primary | ICD-10-CM

## 2021-11-11 DIAGNOSIS — N92.6 MENSTRUAL CHANGES: ICD-10-CM

## 2021-11-11 DIAGNOSIS — F41.9 ANXIETY: Chronic | ICD-10-CM

## 2021-11-11 PROBLEM — F12.20 CANNABIS USE DISORDER, MODERATE, DEPENDENCE (H): Chronic | Status: RESOLVED | Noted: 2017-01-01 | Resolved: 2021-11-11

## 2021-11-11 LAB
ALBUMIN SERPL-MCNC: 4 G/DL (ref 3.4–5)
ALP SERPL-CCNC: 61 U/L (ref 40–150)
ALT SERPL W P-5'-P-CCNC: 15 U/L (ref 0–70)
ANION GAP SERPL CALCULATED.3IONS-SCNC: 4 MMOL/L (ref 3–14)
AST SERPL W P-5'-P-CCNC: 8 U/L (ref 0–45)
BILIRUB SERPL-MCNC: 0.2 MG/DL (ref 0.2–1.3)
BUN SERPL-MCNC: 21 MG/DL (ref 7–30)
CALCIUM SERPL-MCNC: 9.1 MG/DL (ref 8.5–10.1)
CHLORIDE BLD-SCNC: 108 MMOL/L (ref 94–109)
CHOLEST SERPL-MCNC: 219 MG/DL
CO2 SERPL-SCNC: 28 MMOL/L (ref 20–32)
CREAT SERPL-MCNC: 0.64 MG/DL (ref 0.52–1.25)
FASTING STATUS PATIENT QL REPORTED: NO
GFR SERPL CREATININE-BSD FRML MDRD: >90 ML/MIN/1.73M2
GLUCOSE BLD-MCNC: 84 MG/DL (ref 70–99)
HDLC SERPL-MCNC: 43 MG/DL
HGB BLD-MCNC: 13.6 G/DL (ref 11.7–17.7)
LDLC SERPL CALC-MCNC: 122 MG/DL
NONHDLC SERPL-MCNC: 176 MG/DL
POTASSIUM BLD-SCNC: 3.6 MMOL/L (ref 3.4–5.3)
PROLACTIN SERPL-MCNC: 22 UG/L (ref 2–27)
PROT SERPL-MCNC: 7.5 G/DL (ref 6.8–8.8)
SODIUM SERPL-SCNC: 140 MMOL/L (ref 133–144)
TRIGL SERPL-MCNC: 269 MG/DL
TSH SERPL DL<=0.005 MIU/L-ACNC: 1.4 MU/L (ref 0.4–4)

## 2021-11-11 PROCEDURE — 84146 ASSAY OF PROLACTIN: CPT | Performed by: STUDENT IN AN ORGANIZED HEALTH CARE EDUCATION/TRAINING PROGRAM

## 2021-11-11 PROCEDURE — 84443 ASSAY THYROID STIM HORMONE: CPT | Performed by: STUDENT IN AN ORGANIZED HEALTH CARE EDUCATION/TRAINING PROGRAM

## 2021-11-11 PROCEDURE — 84403 ASSAY OF TOTAL TESTOSTERONE: CPT | Mod: KX | Performed by: STUDENT IN AN ORGANIZED HEALTH CARE EDUCATION/TRAINING PROGRAM

## 2021-11-11 PROCEDURE — 85018 HEMOGLOBIN: CPT | Performed by: STUDENT IN AN ORGANIZED HEALTH CARE EDUCATION/TRAINING PROGRAM

## 2021-11-11 PROCEDURE — 80053 COMPREHEN METABOLIC PANEL: CPT | Performed by: STUDENT IN AN ORGANIZED HEALTH CARE EDUCATION/TRAINING PROGRAM

## 2021-11-11 PROCEDURE — 36415 COLL VENOUS BLD VENIPUNCTURE: CPT | Performed by: STUDENT IN AN ORGANIZED HEALTH CARE EDUCATION/TRAINING PROGRAM

## 2021-11-11 PROCEDURE — 99214 OFFICE O/P EST MOD 30 MIN: CPT | Mod: GC | Performed by: STUDENT IN AN ORGANIZED HEALTH CARE EDUCATION/TRAINING PROGRAM

## 2021-11-11 PROCEDURE — 80061 LIPID PANEL: CPT | Performed by: STUDENT IN AN ORGANIZED HEALTH CARE EDUCATION/TRAINING PROGRAM

## 2021-11-11 RX ORDER — TESTOSTERONE 1.62 MG/G
3 GEL TRANSDERMAL DAILY
Qty: 150 G | Refills: 2 | Status: SHIPPED | OUTPATIENT
Start: 2021-11-11 | End: 2022-07-12

## 2021-11-11 NOTE — PROGRESS NOTES
"[facility]  [facility]     Assessment and Plan   Jerson Munoz is a 25 year old who presented to clinic today for the following issues:     Dustmite allergy  Chronic. Has previously been told he'd be a good candidate for allergy shots. Logistical error w/ allergy referral previously. Placed new referral last visit, and has visit scheduled for later this month. In the meantime, continue using azelastine 1-2 sprays per nostril BID (refilled)).     MDD, Anxiety, Familial conflict, ADHD?  Mom's hoarding is still major point of stress. Jerson and mom are trying to get into family counseling. Jerson states today that he would like to restart adderall due to not being able to focus at home. Diagnosed w/ ADHD In 8th grade in [place]. He'll try to track down that paperwork, but that may require him communicating with his dad, which he's adamantly against. From what I gather, I am concerned that anxiety r/t mom's hoarding may be making it difficult for him to think/get things done at home moreso than ADHD, but with a history of diagnosed ADHD, I think it's certainly reasonable to either track down that paperwork so we can prescribe a trial of adderall vs refer for neuropsych ADHD eval. Has tried wellbutrin in the past, which made him \"uselessly drowsy,\" Not sure if he's tried atomexetine/straterra-- will ask mom.     Note: Per Dr. Caro Barnes (1/25/2016: \"Currently, he takes Zoloft 200mg to treat the depression and Adderall 20mg to manage his ADHD symptoms. Melia Galloway MD (his mother) prescribes his medications.\"  - Jerson asking mom if he's tried straterra/atomoxetine in the past  - Continue buspar 10mg daily and sertraline 150mg daily (refilled today)   - Referral to therapy (individual); Dr. Mejia introduced themself today.   - Placed ADHD eval referral     Masculinizing hormone therapy since Nov 2020  Gender dysphoria  Labs 11/11/21 okay-- CMP normal, Hb normal. LDL slightly " "elevated at 122. Testosterone 122 from 297 in August. Clinically masculinizing in some areas (more muscular, hair line rounding), not in others (voice, \"soft and squishy,\" breasts.   - Increased testosterone gel to 3 pumps per day.   - Ordered mammogram  - Placed plastics referral to [facility] for top surgery  - Follow-up in 3 months   - Discuss diet/exercise recs w/ respect to elevated LDL    Menstruating  On testosterone since Nov 2020. Still menstruating every 4-6 weeks.   - Further menstrual history pre-T and post-T at follow-up.     Galactorrhea  When screening for breast-related concerns prior to plastics referral, Jerson answered that he does have bilateral milky discharge when he expresses his nipples. Has been going on for years and he figured it was normal or related to meds. He is not on meds that typically cause galactorrhea. Obtained TSH and prolactin today; both were normal. Unclear etiology right now; low concern given bilateral nature; milky quality; and normal prolactin and TSH.  - Assess further at follow-up. Offer breast exam    Health maintenance  Needs pap smear. He states he'll get it after he's had top surgery.   Declined HIV and HepC screening-- reasonable a he has never used IV drugs and never had any penetrative sex.     Follow-up with me in 3 months re: masculinizing hormone therapy and other issues above.         HPI     Patient goals today:   - Get refill testosterone  - Maybe get adderall? (States: used to be on it. Fine at work; notices unable to work when he gets home. \"I live with a hoarder.\" Headaches nearly every day lately--thinks cleaning room would help, but having trouble focusing on that.)     Gender:   - Will pay attention to how long one bottle androgel is lasting.   - Currently 2 pumps daily. Testosterone in August was 297, down from 660s previously.   - Patient on the fence about whether increased T dose would help with dysphoria or not, but will try 3 pumps daily for a couple " "of months. If worsened mood or just not feeling right, will decrease to 2 pumps daily.   - Voice is most bothersome.   - \"I'm soft and squishy.\" Look childish.   - Hair line was very round; testosterone has helped with that and very happy with that.   - Getting more muscle in upper arms, which he's happy about.   - Would like top surgery. No family h/o breast cancer. Has milky discharge from both breasts if he squeezes at it-- has been going on for years. Thought it was med side effect. No other related concerns.   - Still menstruating occasionally-- due to timing issues between pharmacy and clinic, patient was out of testosterone for a week. Periods have lightened a bit. Still getting every 4-6 weeks.      Mood:  Not currently seeing a therapist.   Trying to get into a therapist to see with mom, who struggles significantly with hoarding.   - Feels like he's able to do okay at work because they don't ask that much of him. 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  - Had formal diagnosis of ADHD in 8th grade in [place]. Was on Adderall for a couple of years, but doesn't recall dose. Didn't start right in 8th grade. Previously saw an ADD , which was very helpful. Last time patient tried getting adderall, he saw a clinic where he felt very dismissed and like he was treated poorly \"I'm not asking for a magic pill, I'm just asking for a tool.\" Has tried wellbutrin in the past, which made him \"uselessly drowsy,\" Not sure if he's tried atomexetine/straterra-- will ask mom.     Doing better keeping a consistent job with a consistent schedule.   \"If I can't keep the house under control, it's just gonna go F**n nuts.\"     From last visit 8/10, still relevant  Working overnights at a shelter program. Going pretty well.   Went to a bit of college, but left for mental health reasons.   Lives with mom.   Has never had penile-vaginal intercourse.   Has had nonconsensual receptive oral " intercourse.    Mom's hoarding is a major source of stress.   Not currently sexually active or at all in the past year.   Hard to get to sleep because  A million things to do before being able to sleep. Once he tries to sleep, he can fall asleep without problem.     On hormones?  Topical testosterone 2 pumps daily.     Gender affirmation is being sought in these other ways: mom is supportive.     On testosterone since Nov 2020. Still getting periods monthly.     Current Outpatient Medications   Medication Sig Dispense Refill     azelastine (ASTELIN) 0.1 % nasal spray Spray 1-2 sprays into both nostrils 2 times daily 30 mL 4     busPIRone (BUSPAR) 10 MG tablet Take 1 tablet (10 mg) by mouth daily Take 1 tablet once a day 90 tablet 1     sertraline (ZOLOFT) 100 MG tablet Take 1.5 tablets (150 mg) by mouth daily . No more refills without an appointment. 135 tablet 3     testosterone (ANDROGEL 1.62 % PUMP) 20.25 MG/ACT gel Place 2 Pump onto the skin daily Apply from dispenser to clean, dry, intact skin of the upper arms and shoulders. 150 g 1       History   Smoking Status     Never Smoker   Smokeless Tobacco     Never Used         Physical exam:  /85   Pulse 68   Temp 98.6  F (37  C) (Oral)   Resp 16   Wt 63.1 kg (139 lb 3.2 oz)   SpO2 97%   BMI 22.19 kg/m    Mood and affect are anxious.   Decent eye contact.   Casually groomed.   Insight and judgement are fair.   Speech is normal in volume, content, and pace.

## 2021-11-11 NOTE — PATIENT INSTRUCTIONS
1) Gender  - Increase T to 3 pumps per day. If not going well, decrease to 2 pumps per day, but please let me know by [employer].   - Placed referral for plastic surgery consult (to evaluate you for top surgery). You need to call them--number should be attached.   - [facility] should call you within next week to schedule mammogram, which you need prior to Plastics appointment.   - YouTube voice therapy lessons. I'll try to look into some as well and get back to you. If not going well, we can refer you to Voice Therapy.     2) Anxiety/ADHD  - Continue buspar and sertraline.   - Meet with therapist here at [facility] Scheduled with Dr. Mejia 11/15/2021 @ 11:20 AM  - I'll place referral for ADHD testing    3) Need pap  After you get your top surgery scheduled, let's work toward getting you a pap smear.     4) At follow-up:   - Follow-up on all the things above  - See if allergy appointment happened  - Follow-up on periods     Patient Education   Here is the plan from today's visit  Thank you for coming to [facility] today.  Lab Testing:  **If you had lab testing today and your results are reassuring or normal they will be mailed to you or sent through [employer] within 7 days.   **If the lab tests need quick action we will call you with the results.  **If you are having labs done on a different day, please call [phone] to schedule at [facility] or [phone] for other [facility] locations. Labs do not offer walk-in appointments.  The phone number we will call with results is # [phone] (home) . If this is not the best number please call our clinic and change the number.  Medication Refills:  If you need any refills please call your pharmacy and they will contact us.   If you need to  your refill at a new pharmacy, please contact the new pharmacy directly. The new pharmacy will help you get your medications transferred faster.   Scheduling:  If you have any concerns about today's visit or wish to  schedule another appointment please call our office during normal business hours [phone] (8-5:00 M-F)  If a referral was made to a [facility] and you don't get a call from central scheduling please call [phone].  If a Mammogram was ordered for you at [facility] call [phone] to schedule or change your appointment.  If you had an EKG/XRay/CT/Ultrasound/MRI ordered the number is [phone] to schedule or change your radiology appointment.   [facility] has limited ultrasound appointments available on Wednesdays, if you would like your ultrasound at [facility], please call [phone] to schedule.   Medical Concerns:  If you have urgent medical concerns please call [phone] at any time of the day.    Hanna Brand MD

## 2021-11-11 NOTE — TELEPHONE ENCOUNTER
[facility]    Phone Message    May a detailed message be left on voicemail: yes     Reason for Call: Appointment Intake      Referring Provider Name: Hanna Brand MD     Diagnosis and/or Symptoms: Evaluation for top surgery gender affirming care (mastectomy)    Action Taken: Message routed to:  [facility] Care     Travel Screening: Not Applicable

## 2021-11-15 ENCOUNTER — OFFICE VISIT (OUTPATIENT)
Dept: PSYCHOLOGY | Facility: CLINIC | Age: 26
End: 2021-11-15
Payer: COMMERCIAL

## 2021-11-15 DIAGNOSIS — F64.0 GENDER DYSPHORIA IN ADOLESCENT AND ADULT: Chronic | ICD-10-CM

## 2021-11-15 DIAGNOSIS — F90.2 ATTENTION DEFICIT HYPERACTIVITY DISORDER (ADHD), COMBINED TYPE: ICD-10-CM

## 2021-11-15 DIAGNOSIS — F33.1 MAJOR DEPRESSIVE DISORDER, RECURRENT EPISODE, MODERATE (H): Primary | Chronic | ICD-10-CM

## 2021-11-15 LAB — TESTOST SERPL-MCNC: 122 NG/DL (ref 8–950)

## 2021-11-15 PROCEDURE — 90832 PSYTX W PT 30 MINUTES: CPT | Mod: HN | Performed by: STUDENT IN AN ORGANIZED HEALTH CARE EDUCATION/TRAINING PROGRAM

## 2021-11-16 NOTE — PROGRESS NOTES
Behavioral Health Progress Note    Client's Legal Name: Jerson Munoz    Client's Preferred Name: Jerson  YOB: 1995  Type of Service: in-person, counseling intake  Length of Service:   Start time: 11:25 AM    End time: 12:00 PM  Duration: 35 minutes  Attendees: patient    Identifying Information and Presenting Problem:  Jerson is a 26 year old adult who is being seen for problematic symptoms of overwhelm, worry, and depressed mood.    Treatment Objective(s) Addressed in This Session:  Introduction to services, informed consent, rapport building    Progress on / Status of Treatment Objective(s) / Homework:  N/a patient not previously known to me    Mental Health Screening Questionnaires:  PHQ-9:   PHQ 3/8/2017 3/24/2020 8/10/2021   PHQ-9 Total Score 10 8 3   Q9: Thoughts of better off dead/self-harm past 2 weeks Several days Not at all Not at all      NOAH-7:   NOAH-7 SCORE 3/8/2017 3/24/2020 8/10/2021   Total Score 0 4 5     Topics Discussed/Interventions Provided:  This was my first visit with Jerson. We reviewed his rights to and the limits of confidentiality. This discussion also included an overview of integrated care as well as the role of open notes in the electronic health record. Jerson was also informed of my position as a post-doctoral fellow and given my supervisor's contact information. Patient was encouraged to ask questions and verbally consented to services provided today. A letter with more detail about informed consent and services was provided to patient as part of the after visit summary.    Jerson is a 26 year old human who presents today at the encouragement of his primary care physician for distress secondary to several psychosocial concerns. Jerson is the youngest of his siblings and lives at home with his mother who is a hoarder. He does not have a great relationship with siblings and feels that he was left to take care of their mother. Recently Jerson has been struggling with  "wanting to help mom clean up the house and pursue his career but there has been little movement for many months. 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 We spent some time talking about his experience with therapy so far which has been both positive and not so good. He is not sure of his exact goals right now and we agreed that the first part of therapy might be helping him create goals.    Assessment:   The patient appeared to be active and engaged in today's session and was receptive to feedback.    Mental Status:   During interaction with the examiner today, Jerson was cooperative, hesitant and easy to engage with. Patient was generally alert and oriented to person, place, time, and situation. They were casually dressed, appropriately groomed and appeared somewhat younger than stated age. Patient's attire was appropriate for the weather and occasion. Eye contact was adequate. Psychomotor functioning: fidgety. Speech was normal limits tone, rate, volume; largely coherent and relevant to topic. Mood was \"okay\" and neutral; affect was normal. Thought processes were unremarkable. Thought content was not remarkable with no evidence of psychotic features and no evidence of suicide, homicide, or nonsuicidal self injury related thoughts, intent, urges, planning, behavior/recent attempts. Memory appeared grossly intact without being formally evaluated. Insight: good. Judgment was good. Patient exhibited good impulse control during the appointment.     Does the patient appear to be at imminent risk of harm to self/others at this time? No    The session was necessary to address symptoms of  that have been interfering with patient's ability to function in areas related to family relationships, maintaining personal health and physical well-being, participating in meaningful activities and household management. Ongoing psychotherapy is necessary to provide " counseling and improve functioning with daily activities.    DSM-5 Diagnosis (per chart):  Major Depressive Disorder, recurrent, moderate  Attention Deficit Hyperactivity Disorder, combined type  Gender Dysphoria    Plan:  1. Follow up with this provider 11/30/2021 @ 8:20 AM in clinic  2. Work on goals as noted in patient instructions.  3. Utilize crisis resources as needed.  4. After Visit Summary will be reviewed in Micky Mejia Psy.D.  they/them/theirs  Primary Care Behavioral Health Fellow    NOTE: Treatment plan due within first 3 sessions.  Diagnostic assessment due at next session in 1 week.

## 2021-11-19 PROBLEM — N92.6 MENSTRUAL CHANGES: Status: ACTIVE | Noted: 2021-11-19

## 2021-11-22 NOTE — PATIENT INSTRUCTIONS
It was a pleasure to work with you today Jerson!    The homework and goals we discussed today:  1) Think of goals for therapy    Our next scheduled appointment(s): 11/30/2021 @ 8:20 AM in clinic    If you need to change a future appointment for any reason, the most efficient way to do so is to call the  at [phone].    Natalie Mejia Psy.D.  they/them/theirs  Primary Care Behavioral Health Fellow  ^^^^^^^^^^^^^^^^^^^^^^^^^^^  Your safety as well as the safety of those around you is important to us. Should you or someone else be in need of them, here are some additional resources:    Feeling overwhelmed and just need a supportive person to talk through a struggling time? (hours 12 PM - 10 PM CST)  Toll Free [phone] or text  Support  to 60354  The [place] Warmline provides a [id number] approach to mental health recovery, support and wellness. Calls are answered by professionally trained Certified Peer Specialists, who have first hand experience living with a mental health condition. (hours 12 PM - 10 PM CST)    Do you feel like you might be in crisis and potentially need professional services?   [facility] Adult Mental Health Crisis:  [phone]  [facility] Children's Mental Health Crisis: [phone]    [facility] Adult Mental Health Crisis: [phone]  [facility] Children's Mental Health Crisis: [phone]    [facility] Multilingual Crisis Line:  [phone]     Crisis Text Line: People who text [place] to 781039 will be connected with a counselor.     [place] Domestic Violence Crisis Line (24-hour [place] crisis line) 4-972-508-6196    Find a local Alcohol or Narcotics Anonymous meeting:  Https://aam[place].org/  https://www.nam[place].org/    If you feel at risk of immediate harm, call 9-1-1 or go directly to the Emergency Department.  ^^^^^^^^^^^^^^^^^^^^^^^^^^^^^^  [facility]  [address]  [place] [address]  [phone]    First Session Patient Information Sheet    My name is Natalie Mejia Psy.D. and I look forward to working with you! I earned my doctorate in Counseling Psychology at [facility]. I am currently in a postdoctoral fellowship here at [facility] [place] to receive specialized training in primary care behavioral health. I am also working to complete my supervised hours to become a licensed psychologist in the [facility].     My direct supervisor at the [facility] is licensed psychologist, Maria Esther Murillo, Ph.D. She and I meet individually each week to discuss my training and clinical caseload. Just like the residents you may have worked with, the work you and I complete together will be billed under my supervisor's name. Therefore, you will likely see reports from your insurance with Dr. Murillo's name rather than mine. Dr. Murillo can be reached at [phone] if you have any questions or concerns.     Here is some general information about behavioral health services, please note that your exact experience may be different based on our discussion today.      Your first 1-2 sessions are focused on assessment That means we will be exploring what brings you in today and what you are hoping to get out of behavioral health services. I will likely ask you a lot of questions about your current symptoms, health history, as well as information about your relationships, emotions, behaviors, experiences, childhood, family of origin, etc. I may also ask you to complete several questionnaires and checklists as part of that assessment process.      When the assessment is completed, will review the results and work together to develop a plan for treatment. This discussion will include recommendations about what services are most appropriate for your circumstances based on available research and practice guidelines. Many times, I will continue with  your care unless it becomes clear that we need to refer you to another provider or organization better suited to meet your specific needs. We may also discuss other services that you may benefit from engaging with such as social work team or behavioral health home.      Most patients attend appointments once a week or once every other week at the beginning of treatment. However, we will discuss a schedule that best fits your needs.       Due to confidentiality, I cannot exchange e-mail with patients. If you need to reach me, please leave a message for me at the  [phone].    I am committed to providing my patients with the best care possible. That means it is important to me that I provider services that fit for your unique needs and preferences. If there is anything you would like me to do differently, please let me know at any time!    Thank you, and I look forward to working with you!    Natalie Mejia Psy.D.  they/them/theirs  Primary Care Behavioral Health Fellow

## 2021-11-23 ENCOUNTER — OFFICE VISIT (OUTPATIENT)
Dept: ALLERGY | Facility: CLINIC | Age: 26
End: 2021-11-23
Payer: COMMERCIAL

## 2021-11-23 ENCOUNTER — PRE VISIT (OUTPATIENT)
Dept: ALLERGY | Facility: CLINIC | Age: 26
End: 2021-11-23

## 2021-11-23 DIAGNOSIS — Z91.09 HOUSE DUST MITE ALLERGY: ICD-10-CM

## 2021-11-23 DIAGNOSIS — J30.89 SEASONAL ALLERGIC RHINITIS DUE TO OTHER ALLERGIC TRIGGER: ICD-10-CM

## 2021-11-23 DIAGNOSIS — J30.9 ALLERGIC RHINITIS WITH POSTNASAL DRIP: ICD-10-CM

## 2021-11-23 DIAGNOSIS — J30.89 NON-SEASONAL ALLERGIC RHINITIS DUE TO OTHER ALLERGIC TRIGGER: Primary | ICD-10-CM

## 2021-11-23 DIAGNOSIS — R09.82 ALLERGIC RHINITIS WITH POSTNASAL DRIP: ICD-10-CM

## 2021-11-23 PROCEDURE — 99214 OFFICE O/P EST MOD 30 MIN: CPT | Mod: 25 | Performed by: DERMATOLOGY

## 2021-11-23 PROCEDURE — 95004 PERQ TESTS W/ALRGNC XTRCS: CPT | Mod: GC | Performed by: DERMATOLOGY

## 2021-11-23 ASSESSMENT — PAIN SCALES - GENERAL: PAINLEVEL: NO PAIN (0)

## 2021-11-23 NOTE — PATIENT INSTRUCTIONS
House Dust Mite Allergy        The house dust mite is an arachnid about 0.3 mm in size and not visible to the naked eye. There are around 150 species of house dust mites in the world. One mite produces up to 40 fecal droppings a day. One teaspoonful of bedroom dust contains an average of nearly 1000 mites and 250,000 minute droppings.    Causes and triggers of house dust mite allergy  The house dust mite requires a warm, moist environment without light in order to live and reproduce. Our beds are ideal. The mite feeds on human and animal skin scales. The allergen is mainly contained in the mite's feces. The feces contain allergy-triggering constituents which are spread in fine dust, are breathed in and can cause an allergic reaction.    Symptoms  When the allergens come into contact with the mucous membranes in the eyes, nose, mouth and throat, sufferers develop symptoms typical of an allergic cold (allergic rhinitis) or an allergic inflammation of the conjunctiva (allergic conjunctivitis): blocked or runny nose, sneezing, red, itchy eyes. If all of these symptoms are present, then the condition is also known as rhinoconjunctivitis. Often, the upper respiratory tract becomes chronically inflamed, primarily because house dust mites are present all year round.  The symptoms of house dust mite allergy typically occur in the morning and are more frequent in the cold months of the year.    Therapy and treatment  As a first step, mattress, pillows and duvet/comforter should be placed in mite-proof or anti-mite covers, sometimes known as encasings. Alternatively you can use pillows or comforter that can be washed at over 130 F monthly. At the same time, house dust should be minimized. If necessary, the symptoms can be treated with medication, for example antihistamines in the form of nasal sprays, eye drops and tablets. Desensitization/specific immunotherapy (SIT) is recommended for house dust allergy if all the measures  "above are not sufficient.    Tips and tricks:    Keep room temperature at 66-70 F and relative air humidity at a maximum of 50%.    Ideally, thoroughly air your home two to three times a day for 5 to 10 minutes each time.    Wash bed linens in at least 130 F every week.    Remove stuffed animals or freeze them every other week.    Keep ceiling fans off in the bedroom as they can stir up dust mite allergens.    Remove dust from furniture with a damp cloth and regularly wet mop floors.    Do not put pot and hydroponic plants in the bedroom and also avoid putting too many in living areas, as they increase room humidity.    When staying overnight in other accommodations, we recommend taking your own bed linen and the above anti-mite mattress covers with you.    Remove upholstered furniture from the bedroom and consider removing the carpets. Ideally, use sealed parquet or laminate clyde, cork tiles or clyde made of wood, novilon or PVC.    Maybe additionally reduce dust mites in mattress, upholstery, or clyde using hot steam .      Modified from \"House Dust Mite Allergy\" by [employer]! [employer] [facility] [place].    Mold Allergy    Molds are thread-like micro-fungi - they occur all over the world and grow particularly in places where there is moisture. If living spaces are infested with mold, it must be removed quickly. It may pose a health risk.    Triggers of mold allergy   The most important known allergens among fungi are Aspergillus species, Alternaria alternata, Cladosporium species and Penicillium species, which all belong to the category of molds. Molds are found all over the world, but predominantly in nature, mainly in the soil, in dead organic matter. Indoors molds develop in places where it is damp, such as areas affected by leaks, in tony cracks or when the relative air humidity is high. Poorly maintained ventilation systems, air humidifiers, aquariums, or decorative fountains and a lot of plants in a " "room can also lead to a mold infestation.    Symptoms   Mold allergy, like other respiratory allergies, is manifest as allergic runny nose, streaming eyes, cough and shortness of breath and is frequently accompanied by asthma. As well as allergic reactions, molds also cause irritation of the airways, the mucous membranes of the eyes and the skin. The growth of mold is often associated with a distinctively musty smell of earth, damp and mushrooms, which additionally impairs well-being.    Therapy and treatment   Any fungal infestation in living areas must be removed rapidly and properly. It is always important to get rid of the cause, hence eradicate the damp problem. Otherwise the mold will quickly reappear. People with health problems should not remove even small patches of mold growth. Before the mold is cleared and in the weeks following its clearance, rooms should also be aired frequently and dust should be removed.    Tips and tricks:     Keep relative air humidity to a maximum of 45 per cent in winter    Keep temperature between 66 and 73 C in winter    Air rooms thoroughly for five to ten minutes twice to three times a day    Position furniture at least ten centimetres away from walls    No plants in the bedroom area    Dispose of compost or organic waste on a daily basis or store temporarily outside the home    Do not use air humidifiers or only use them if air humidity is continuously monitored        Modified from \"Mould Allergy\" by [employer]! [employer] [place].      "

## 2021-11-23 NOTE — PROGRESS NOTES
[facility] Dermato-allergology Note  Office visit  Encounter Date: Nov 23, 2021  ____________________________________________    CC: No chief complaint on file.      HPI:  (11/22/21)  Mr. Jerson Munoz is a(n) 26 year old adult who presents today as a new patient for allergy consultation  - Allergies continue to be constant despite azelastine. Not taking other treatments. Has been off the azelastine for a week and is congested, migrainous, difficulty sleeping, sore throat, sleeping with mouth open.   - saw allergist here with prick test and had + dust mite allergy (see below). He then got air filters, mattress and pillow covers, and started taking azelastine, but still has symptoms. Feels like his symptoms are worse in the AM.   - previously tried Nasacort which helped a little, but no longer taking it. Also tried zyrtec, allegra, claritine but did not help enough so stopped.     >> He is interested in starting sublingual immunotherapy.     - otherwise feeling well in usual state of health    Had visit with allergist in 2018 and results were allergy to dust mites (see below). Started azelastine BID and was told could consider shots in the future:    Last Percutaneous Allergy Test Results  Trees  Dante, White  1:20 H  (W/F in mm): 0/0 (05/30/18 1547)  Birch Mix 1:20 H (W/F in mm): 0/0 (05/30/18 1547)  Coconino, Common 1:20 H (W/F in mm): 0/0 (05/30/18 1547)  Elm, American 1:20 H (W/F in mm): 0/0 (05/30/18 1547)  [place], Shagbark 1:20 H (W/F in mm): 0/0 (05/30/18 1547)  Maple, Hard/Sugar 1:20 H (W/F in mm): 0/0 (05/30/18 1547)  [place] Mix 1:20 H (W/F in mm): 0/0 (05/30/18 1547)  [place], Red 1:20 H (W/F in mm): 0/0 (05/30/18 1547)  Arnold, American 1:20 H (W/F in mm): 0/0 (05/30/18 1547)  [place] Tree 1:20 H (W/F in mm): 0/0 (05/30/18 1547)  Dust Mites  D. Pteronyssinus Mite 30,000 AU/ML H (W/F in mm): 9/32 (05/30/18 1547)  D. Farinae Mite 30,000 AU/ML H (W/F in mm: 10/43 (05/30/18  1547)  Grasses  Grass Mix #4 10,000 BAU/ML H: 0/0 (05/30/18 1547)  Mando Grass 1:20 H (W/F in mm): 0/0 (05/30/18 1547)  Cockroach  Cockroach Mix 1:10 H (W/F in mm): 0/0 (05/30/18 1547)  Molds/Fungi  Alternaria Tenuis 1:10 H (W/F in mm): 0/0 (05/30/18 1547)  Aspergillus Fumigatus 1:10 H (W/F in mm): 0/0 (05/30/18 1547)  Homodendrum Cladosporioides 1:10 H (W/F in mm): 0/0 (05/30/18 1547)  Penicillin Notatum 1:10 H (W/F in mm): 0/0 (05/30/18 1547)  Epicoccum 1:10 H (W/F in mm): 0/0 (05/30/18 1547)  Weeds  Ragweed, Short 1:20 H (W/F in mm): 0/0 (05/30/18 1547)  Dock, Sorrel 1:20 H (W/F in mm): 0/0 (05/30/18 1547)  Lamb's Quarter 1:20 H (W/F in mm): 0/0 (05/30/18 1547)  Pigweed, Rough Red Root 1:20 H  (W/F in mm): 0/0 (05/30/18 1547)  Plantain, English 1:20 H  (W/F in mm): 0/0 (05/30/18 1547)  Sagebrush, Mugwort 1:20 H  (W/F in mm): 0/0 (05/30/18 1547)  Animal  Cat 10,000 BAU/ML H (W/F in mm): 0/0 (05/30/18 1547)  Dog 1:10 H (W/F in mm): 0/0 (05/30/18 1547)  Controls  Device Type: QUINTIP (05/30/18 1547)  Neg. control: 50% Glycerine/Saline H (W/F in mm): 0/0 (05/30/18 1547)  Pos. control: Histamine 6mg/ML (W/F in mms): 5/22 (05/30/18 8887)     Physical exam:  General: In no acute distress, well-developed, well-nourished  Eyes: no conjunctivitis  ENT: no signs of rhinitis   Pulmonary: no wheezing or coughing  Skin: clear today    Past Medical History:   Patient Active Problem List   Diagnosis     Gender dysphoria in adolescent and adult     Anxiety     Attention deficit hyperactivity disorder (ADHD), combined type     Major depressive disorder, recurrent episode, moderate (H)     Chronic rhinitis     Dyslipidemia     Vitamin D deficiency     Menstrual changes     Past Medical History:   Diagnosis Date     Attention deficit hyperactivity disorder (ADHD), combined type 1/27/2016     Migraines        Allergies:  Allergies   Allergen Reactions     Dust Mites Headache       Medications:  Current Outpatient Medications    Medication     azelastine (ASTELIN) 0.1 % nasal spray     busPIRone (BUSPAR) 10 MG tablet     sertraline (ZOLOFT) 100 MG tablet     testosterone (ANDROGEL 1.62 % PUMP) 20.25 MG/ACT gel     No current facility-administered medications for this visit.       Social History:  The patient works as shelter . Patient has the following hobbies or non-occupational exposure: no, but location he will be working in in December will be old [place] buildings.    Family History:  Family History   Problem Relation Age of Onset     Depression Mother      Glaucoma Mother      Insulin Resistance Mother      Mental Illness Father      Sleep Apnea Father      Heart Disease Paternal Grandfather      Mental Illness Sister      Supraventricular tachycardia Brother      Myocardial Infarction Maternal Grandfather      No Known Problems Paternal Grandmother      No Known Problems Sister      Cerebrovascular Disease Maternal Cousin      Myocardial Infarction Maternal Cousin      Diabetes Maternal Cousin        Previous Labs, Allergy Tests, Dermatopathology, Imaging:  no    Referred By: Hanna Brand MD  [address] [place] [address]     Allergy Tests:    Past Allergy Test    Order for Future Allergy Testing:    [] Outpatient  [] Inpatient: Maya..../ Bed ....       Skin Atopy (atopic dermatitis) [x] Yes   [] No child.........    Contact allergies:   [] Yes   [x] No ..........  Hand eczema:   [] Yes   [x] No           Leading hand:   [] R   [] L       [] Ambidextrous         Drug allergies:        [] Yes   [x] No  which?......    Urticaria/Angioedema  [] Yes   [] No .........  Food Allergy:  [x] Yes   [] No cilllantro, coriander, cardamon      Pets :  [x] Yes   [] No  Which? Dog, 3 cats, a hamster       [x]  Rhinitis   [] Conjunctivitis   [x] Sinusitis   [] Polyposis   [] Otitis   [x] Pharyngitis         []  none  Operations:   [] Tonsils   [] Septum   [] Sinus   [] Polyposis        [] Asthma bronchiale   []  Coughing      []  none  Symptoms (mostly Rhinoconjunctivitis and Asthma) aggravated by:  Season   [] I   [] II   [] III   [] IV   []V   []VI   []VII   []VIII   []IX   []X   []XI   []XI     [] perennial   Day time      [] morning   [] noon      [] evening        [] night    [x] whole day........  []  none  Location/changes    [x] inside        [] outside   [] mountains    [] sea     [] others.............   []  none  Triggers, specific     [] animals     [] plants     [x] dust              [] others ...........................    []  none  Triggers, others       [x] work  (depends on location)        [] psyche    [] sport            [] others .............................  []  none  Irritant                [] phys efforts [] smoke    [] heat/cold     [] odors  [x]others. Hair conditioner causes sneezing in the shower (but not if out of shower).............. []  none      Order for PRICK TESTS    Reason for tests (suspected allergy): dust mite (+ in 2018) and mold  Known previous allergies: none    Standardized prick panels  [x] Atopic panel (20 tests) 1-8 (if negative --> intradermal testing to dust mites and molds)  [] Pediatric Panel (12 tests)  [] Milk, Meat, Eggs, Grains (20 tests)   [] Dust, Epithelia, Feathers (10 tests)  [] Fish, Seafood, Shellfish (17 tests)  [] Nuts, Beans (14 tests)  [] Spice, Vegetable, Fruit (17 tests)  [] Pollen Panel = Tree, Grass, Weed (24 tests)  [] Others: ...      [] Patient's own products: ...    DO NOT test if chemical or biological identity is unknown!     always ask from patient the product information and safety sheets (MSDS)     Standardized intradermal tests  [] Penicillium notatum [] Aspergillus fumigatus [] House dust mites D.far & D. pteron  [] Cat    [] dog  [] Others: ...  [] Bee venom   [] Wasp venom  !!Specific protocol with dilutions!!       Order for Drug allergy tests (prick & Intradermal &  patch tests)    [] Penicillin G  [] Ampicillin [] Cefazolin   []  Ceftriaxone   [] Ceftazidime  [] Bactrim    [] Others: ...  Order for ... as test date    Atopy Screen (Placed 11/23/21)    No Substance Readings (15 min) Evaluation   POS Histamine 1mg/ml ++    NEG NaCl 0.9% -      No Substance Readings (15 min) Evaluation   1 Alternaria alternata (tenuis)  +/++    2 Cladosporium herbarum +/++    3 Aspergillus fumigatus +    4 Penicillium notatum -    5 Dermatophagoides pteronyssinus ++/+++    6 Dermatophagoides farinae ++    7 Dog epithelium (canis spp) -    8 Cat hair (ramon catus) -    9 Cockroach   (Blatella americana & germanica) -    10 Grass mix midwest   (Aimee, Orchard, [place], Joey) -    11 Mando grass (sorghum halepense) -    12 Weed mix   (common Cocklebur, Lamb s quarters, rough redroot Pigweed, Dock/Sorrel) -    13 Mug wort (artemisia vulgare) -    14 Ragweed giant/short (ambrosia spp) -    15 White birch (Betula papyrifera) -    16 Tree mix 1 (Pecan, Maple BHR, Oak RVW, american [place], black [place]) -    17 Red cedar (juniperus [place]) +    18 Tree mix 2   (white Dante, river/red Birch, black [place], common [place], american Elm) -    19 Box elder/Maple mix (acer spp) -    20 [place] shagbark (carya ovata) -           Conclusion: based on history and skin tests clear and relevant sensitization to house dust mites. Some sensitization to seasonal molds (Alternaria and Cladosporium) and the perennial mold Aspergillus      ________________________________    Assessment & Plan:    ==> Final Diagnosis:     # Perennial Rhinosinusitis, postnasal drip with relevant sensitization to house dust mites   Less to molds.  * chronic illness with exacerbation, progression, side effects from treatment      These conclusions are made at the best of one's knowledge and belief based on the provided evidence such as patient's history and allergy test results and they can change over time or can be incomplete because of missing information's.    ==> Treatment Plan:  - cont  azelastine BID  - start OTC allegra 180 mg qPM   - will consider allergy immunizations pending on today's tests and possible referral to [facility] for allergy shots. Could also have PCP do immunizations.     Procedures Performed: Allergy tests, including prick, intradermal     Dr Loo - staff Provider  Dr Nixon - PGY3 resident    Staff Physician Comments:  I saw and evaluated the patient with the resident and I agree with the assessment and plan as documented in the resident's note.    Tristan Loo MD  Professor  Head of Dermato-Allergy Division  Department of Dermatology  [facility]      Follow-up in Derm-Allergy clinic PRN    I spent a total of 35 minutes with Jerson Munoz. This time was spent counseling the patient and/or coordinating care, explaining the allergy tests, performing allergy tests and assessing the clinical relevance.

## 2021-11-23 NOTE — NURSING NOTE
Dermatology Rooming Note    Jerson Munoz's goals for this visit include:   Chief Complaint   Patient presents with     Allergy Consult     Josh is here today for all year around allergies     Stef Toth, KELBY

## 2021-11-23 NOTE — LETTER
11/23/2021         RE: Jerson Munoz  [address] [place] [address]        Dear Colleague,    Thank you for referring your patient, Jerson Munoz, to the [facility] [place]. Please see a copy of my visit note below.    [facility] Dermato-allergology Note  Office visit  Encounter Date: Nov 23, 2021  ____________________________________________    CC: No chief complaint on file.      HPI:  (11/22/21)  Mr. Jerson Munoz is a(n) 26 year old adult who presents today as a new patient for allergy consultation  - Allergies continue to be constant despite azelastine. Not taking other treatments. Has been off the azelastine for a week and is congested, migrainous, difficulty sleeping, sore throat, sleeping with mouth open.   - saw allergist here with prick test and had + dust mite allergy (see below). He then got air filters, mattress and pillow covers, and started taking azelastine, but still has symptoms. Feels like his symptoms are worse in the AM.   - previously tried Nasacort which helped a little, but no longer taking it. Also tried zyrtec, allegra, claritine but did not help enough so stopped.     >> He is interested in starting sublingual immunotherapy.     - otherwise feeling well in usual state of health    Had visit with allergist in 2018 and results were allergy to dust mites (see below). Started azelastine BID and was told could consider shots in the future:    Last Percutaneous Allergy Test Results  Trees  Dante, White  1:20 H  (W/F in mm): 0/0 (05/30/18 1547)  Birch Mix 1:20 H (W/F in mm): 0/0 (05/30/18 1547)  [place], Common 1:20 H (W/F in mm): 0/0 (05/30/18 1547)  Elm, American 1:20 H (W/F in mm): 0/0 (05/30/18 1547)  Nithin Burrell 1:20 H (W/F in mm): 0/0 (05/30/18 1547)  Maple, Hard/Sugar 1:20 H (W/F in mm): 0/0 (05/30/18 1547)  [place] Mix 1:20 H (W/F in mm): 0/0 (05/30/18 1547)  [place], Red 1:20 H (W/F in mm): 0/0 (05/30/18  1547)  Sims, American 1:20 H (W/F in mm): 0/0 (05/30/18 1547)  [place] Tree 1:20 H (W/F in mm): 0/0 (05/30/18 1547)  Dust Mites  D. Pteronyssinus Mite 30,000 AU/ML H (W/F in mm): 9/32 (05/30/18 1547)  D. Farinae Mite 30,000 AU/ML H (W/F in mm: 10/43 (05/30/18 1547)  Grasses  Grass Mix #4 10,000 BAU/ML H: 0/0 (05/30/18 1547)  Mando Grass 1:20 H (W/F in mm): 0/0 (05/30/18 1547)  Cockroach  Cockroach Mix 1:10 H (W/F in mm): 0/0 (05/30/18 1547)  Molds/Fungi  Alternaria Tenuis 1:10 H (W/F in mm): 0/0 (05/30/18 1547)  Aspergillus Fumigatus 1:10 H (W/F in mm): 0/0 (05/30/18 1547)  Homodendrum Cladosporioides 1:10 H (W/F in mm): 0/0 (05/30/18 1547)  Penicillin Notatum 1:10 H (W/F in mm): 0/0 (05/30/18 1547)  Epicoccum 1:10 H (W/F in mm): 0/0 (05/30/18 1547)  Weeds  Ragweed, Short 1:20 H (W/F in mm): 0/0 (05/30/18 1547)  Dock, Sorrel 1:20 H (W/F in mm): 0/0 (05/30/18 1547)  Lamb's Quarter 1:20 H (W/F in mm): 0/0 (05/30/18 1547)  Pigweed, Rough Red Root 1:20 H  (W/F in mm): 0/0 (05/30/18 1547)  Plantain, English 1:20 H  (W/F in mm): 0/0 (05/30/18 1547)  Sagebrush, Mugwort 1:20 H  (W/F in mm): 0/0 (05/30/18 1547)  Animal  Cat 10,000 BAU/ML H (W/F in mm): 0/0 (05/30/18 1547)  Dog 1:10 H (W/F in mm): 0/0 (05/30/18 1547)  Controls  Device Type: QUINTIP (05/30/18 1547)  Neg. control: 50% Glycerine/Saline H (W/F in mm): 0/0 (05/30/18 1547)  Pos. control: Histamine 6mg/ML (W/F in mms): 5/22 (05/30/18 1547)     Physical exam:  General: In no acute distress, well-developed, well-nourished  Eyes: no conjunctivitis  ENT: no signs of rhinitis   Pulmonary: no wheezing or coughing  Skin: clear today    Past Medical History:   Patient Active Problem List   Diagnosis     Gender dysphoria in adolescent and adult     Anxiety     Attention deficit hyperactivity disorder (ADHD), combined type     Major depressive disorder, recurrent episode, moderate (H)     Chronic rhinitis     Dyslipidemia     Vitamin D deficiency     Menstrual changes      Past Medical History:   Diagnosis Date     Attention deficit hyperactivity disorder (ADHD), combined type 1/27/2016     Migraines        Allergies:  Allergies   Allergen Reactions     Dust Mites Headache       Medications:  Current Outpatient Medications   Medication     azelastine (ASTELIN) 0.1 % nasal spray     busPIRone (BUSPAR) 10 MG tablet     sertraline (ZOLOFT) 100 MG tablet     testosterone (ANDROGEL 1.62 % PUMP) 20.25 MG/ACT gel     No current facility-administered medications for this visit.       Social History:  The patient works as shelter . Patient has the following hobbies or non-occupational exposure: no, but location he will be working in in December will be old [employer].    Family History:  Family History   Problem Relation Age of Onset     Depression Mother      Glaucoma Mother      Insulin Resistance Mother      Mental Illness Father      Sleep Apnea Father      Heart Disease Paternal Grandfather      Mental Illness Sister      Supraventricular tachycardia Brother      Myocardial Infarction Maternal Grandfather      No Known Problems Paternal Grandmother      No Known Problems Sister      Cerebrovascular Disease Maternal Cousin      Myocardial Infarction Maternal Cousin      Diabetes Maternal Cousin        Previous Labs, Allergy Tests, Dermatopathology, Imaging:  no    Referred By: Hanna Brand MD  [address] [place] [address]     Allergy Tests:    Past Allergy Test    Order for Future Allergy Testing:    [] Outpatient  [] Inpatient: [facility]..../ Bed ....       Skin Atopy (atopic dermatitis) [x] Yes   [] No child.........    Contact allergies:   [] Yes   [x] No ..........  Hand eczema:   [] Yes   [x] No           Leading hand:   [] R   [] L       [] Ambidextrous         Drug allergies:        [] Yes   [x] No  which?......    Urticaria/Angioedema  [] Yes   [] No .........  Food Allergy:  [x] Yes   [] No cilllantro, coriander, cardamon      Pets :  [x] Yes    [] No  Which? Dog, 3 cats, a hamster       [x]  Rhinitis   [] Conjunctivitis   [x] Sinusitis   [] Polyposis   [] Otitis   [x] Pharyngitis         []  none  Operations:   [] Tonsils   [] Septum   [] Sinus   [] Polyposis        [] Asthma bronchiale   [] Coughing      []  none  Symptoms (mostly Rhinoconjunctivitis and Asthma) aggravated by:  Season   [] I   [] II   [] III   [] IV   []V   []VI   []VII   []VIII   []IX   []X   []XI   []XI     [] perennial   Day time      [] morning   [] noon      [] evening        [] night    [x] whole day........  []  none  Location/changes    [x] inside        [] outside   [] mountains    [] sea     [] others.............   []  none  Triggers, specific     [] animals     [] plants     [x] dust              [] others ...........................    []  none  Triggers, others       [x] work  (depends on location)        [] psyche    [] sport            [] others .............................  []  none  Irritant                [] phys efforts [] smoke    [] heat/cold     [] odors  [x]others. Hair conditioner causes sneezing in the shower (but not if out of shower).............. []  none      Order for PRICK TESTS    Reason for tests (suspected allergy): dust mite (+ in 2018) and mold  Known previous allergies: none    Standardized prick panels  [x] Atopic panel (20 tests) 1-8 (if negative --> intradermal testing to dust mites and molds)  [] Pediatric Panel (12 tests)  [] Milk, Meat, Eggs, Grains (20 tests)   [] Dust, Epithelia, Feathers (10 tests)  [] Fish, Seafood, Shellfish (17 tests)  [] Nuts, Beans (14 tests)  [] Spice, Vegetable, Fruit (17 tests)  [] Pollen Panel = Tree, Grass, Weed (24 tests)  [] Others: ...      [] Patient's own products: ...    DO NOT test if chemical or biological identity is unknown!     always ask from patient the product information and safety sheets (MSDS)     Standardized intradermal tests  [] Penicillium notatum [] Aspergillus fumigatus [] House dust mites  D.far & D. pteron  [] Cat    [] dog  [] Others: ...  [] Bee venom   [] Wasp venom  !!Specific protocol with dilutions!!       Order for Drug allergy tests (prick & Intradermal &  patch tests)    [] Penicillin G  [] Ampicillin [] Cefazolin   [] Ceftriaxone   [] Ceftazidime  [] Bactrim    [] Others: ...  Order for ... as test date    Atopy Screen (Placed 11/23/21)    No Substance Readings (15 min) Evaluation   POS Histamine 1mg/ml ++    NEG NaCl 0.9% -      No Substance Readings (15 min) Evaluation   1 Alternaria alternata (tenuis)  +/++    2 Cladosporium herbarum +/++    3 Aspergillus fumigatus +    4 Penicillium notatum -    5 Dermatophagoides pteronyssinus ++/+++    6 Dermatophagoides farinae ++    7 Dog epithelium (canis spp) -    8 Cat hair (ramon catus) -    9 Cockroach   (Blatella americana & germanica) -    10 Grass mix midwest   (Aimee, Orchard, Redtop, Joey) -    11 Mando grass (sorghum halepense) -    12 Weed mix   (common Cocklebur, Lamb s quarters, rough redroot Pigweed, Dock/Sorrel) -    13 Mug wort (artemisia vulgare) -    14 Ragweed giant/short (ambrosia spp) -    15 White birch (Betula papyrifera) -    16 Tree mix 1 (Pecan, Maple BHR, Oak RVW, american [place], black [place]) -    17 Red cedar (juniperus virginia) +    18 Tree mix 2   (white Dante, river/red Birch, black [place], common [place], american Elm) -    19 Box elder/Maple mix (acer spp) -    20 [place] shagbark (carya ovata) -           Conclusion: based on history and skin tests clear and relevant sensitization to house dust mites. Some sensitization to seasonal molds (Alternaria and Cladosporium) and the perennial mold Aspergillus      ________________________________    Assessment & Plan:    ==> Final Diagnosis:     # Perennial Rhinosinusitis, postnasal drip with relevant sensitization to house dust mites   Less to molds.  * chronic illness with exacerbation, progression, side effects from treatment      These conclusions are made at  the best of one's knowledge and belief based on the provided evidence such as patient's history and allergy test results and they can change over time or can be incomplete because of missing information's.    ==> Treatment Plan:  - cont azelastine BID  - start OTC allegra 180 mg qPM   - will consider allergy immunizations pending on today's tests and possible referral to [facility] for allergy shots. Could also have PCP do immunizations.     Procedures Performed: Allergy tests, including prick, intradermal     Dr Loo - staff Provider  Dr Nixon - PGY3 resident    Staff Physician Comments:  I saw and evaluated the patient with the resident and I agree with the assessment and plan as documented in the resident's note.    Tristan Loo MD  Professor  Head of Dermato-Allergy Division  Department of Dermatology  [facility]      Follow-up in Derm-Allergy clinic PRN    I spent a total of 35 minutes with Jerson Munoz. This time was spent counseling the patient and/or coordinating care, explaining the allergy tests, performing allergy tests and assessing the clinical relevance.        Again, thank you for allowing me to participate in the care of your patient.        Sincerely,        Tristan Loo MD

## 2021-11-23 NOTE — PROGRESS NOTES
Preceptor Attestation:  I have reviewed and agree with the behavioral health fellow's documentation for this visit.  I did not see the patient.  Supervising Clinical Psychologist:  Maria Esther Murillo PSYD LP

## 2021-11-30 ENCOUNTER — OFFICE VISIT (OUTPATIENT)
Dept: PSYCHOLOGY | Facility: CLINIC | Age: 26
End: 2021-11-30
Payer: COMMERCIAL

## 2021-11-30 DIAGNOSIS — F64.0 GENDER DYSPHORIA IN ADOLESCENT AND ADULT: ICD-10-CM

## 2021-11-30 DIAGNOSIS — F33.1 MAJOR DEPRESSIVE DISORDER, RECURRENT EPISODE, MODERATE (H): Primary | ICD-10-CM

## 2021-11-30 DIAGNOSIS — F90.2 ATTENTION DEFICIT HYPERACTIVITY DISORDER (ADHD), COMBINED TYPE: ICD-10-CM

## 2021-11-30 PROCEDURE — 90832 PSYTX W PT 30 MINUTES: CPT | Mod: HN | Performed by: STUDENT IN AN ORGANIZED HEALTH CARE EDUCATION/TRAINING PROGRAM

## 2021-11-30 NOTE — PROGRESS NOTES
Behavioral Health Progress Note    Client's Legal Name: Jerson Munoz    Client's Preferred Name: Jerson  YOB: 1995  Type of Service: in-person, counseling  Length of Service:   Start time: 8:28 AM    End time: 9:00   Duration: 31 minutes  Attendees: patient    Identifying Information and Presenting Problem:  Jerson is a 26 year old adult who is being seen for problematic symptoms of overwhelm, worry, and depressed mood. Much of his distress is related to chronic disorganization, difficulty initiating and completing tasks, as well as difficult situation at home with mother who struggles with hoarding.    Treatment Objective(s) Addressed in This Session:  Depressed Mood: Increase interest, engagement, and pleasure in doing things  Attention Problems: will develop coping skills to effectively manage attention issues    Progress on / Status of Treatment Objective(s) / Homework:  Stable     Mental Health Screening Questionnaires:  PHQ-9:   PHQ 3/8/2017 3/24/2020 8/10/2021   PHQ-9 Total Score 10 8 3   Q9: Thoughts of better off dead/self-harm past 2 weeks Several days Not at all Not at all      NOAH-7:   NOAH-7 SCORE 3/8/2017 3/24/2020 8/10/2021   Total Score 0 4 5     Topics Discussed/Interventions Provided:  Session began with mood check in and collaborative creation of the agenda with items including goals for therapy, Thanksgiving holiday, and continued information gathering. Generally, Jerson described his mood as stable though wonder how much of his report is understatement. He would like to work on being more engaged in his life including having goals, being more organized, and being able to start then complete tasks. Jerson has a prior diagnosis of ADHD that is not currently being treated with medication. He has a history of psychotherapy and psychiatric treatment for gender dysphoria and depression at [facility] as well as [facility]. Regarding Thanksgiving holiday, it was stressful  "for Jerson who felt that he was rushing around trying to clean the house for guests. He and his mother hosted brother for holiday meal. Listened with empathy and provided validation as Jerson shared some feelings of disappointment that he was not able to see his sister(s). Much of our session was reviewing his difficulty with organization and task initiation/completion in more detail. Task initiation appears to be a primary problem as well as prioritization. Jerson is resistant to creating a consistent schedule - he works third shift and changes his sleep schedule on days off, provided some education about the importance of consistency. We agreed to focus on creating routines and habits beginning with creating a nighttime checklist that will have a series of tasks. Some tasks differ by day so he will divide parts of the house in different sections to be focused on each day.    Assessment:   The patient appeared to be active and engaged in today's session and was receptive to feedback. Looking in the EHR, don't see any neuropsych testing that diagnosed ADHD. Will inquire more about his history of evaluation and treatment for ADHD. He is resistant to creating routine and has challenges breaking down task into manageable chunks. Next session will get measurement based care items updated and complete diagnostic assessment.    Mental Status:   During interaction with the examiner today, Jerson was cooperative, easy to engage with, respectful and appeared to minimize symptoms. Patient was generally alert and oriented to person, place, time, and situation. They were casually dressed, well groomed and appeared stated age. Patient's attire was appropriate for the weather and occasion. Eye contact was fair. Psychomotor functioning: fidgety. Speech was normal limits tone, rate, volume; largely coherent and relevant to topic. Mood was \"okay\" and depressed; affect was mood-congruent. Thought processes were unremarkable. Thought " content was not remarkable with no evidence of psychotic features and no evidence of suicide, homicide, or nonsuicidal self injury related thoughts, intent, urges, planning, behavior/recent attempts. Memory appeared grossly intact without being formally evaluated. Insight: adequate. Judgment was good. Patient exhibited good impulse control during the appointment.     Does the patient appear to be at imminent risk of harm to self/others at this time? No    The session was necessary to address symptoms of  that have been interfering with patient's ability to function in areas related to family relationships, maintaining personal health and physical well-being, participating in meaningful activities and household management. Ongoing psychotherapy is necessary to provide counseling and improve functioning with daily activities.     DSM-5 Diagnosis (per chart):  Major Depressive Disorder, recurrent, moderate  Attention Deficit Hyperactivity Disorder, combined type  Gender Dysphoria     Plan:  1. Follow up with this provider 12/14/2021 @ 8:20 AM in clinic  2. Work on goals as noted in patient instructions.  3. Utilize crisis resources as needed.  4. After Visit Summary will be reviewed in Micky Mejia Psy.D.  they/them/theirs  Primary Care Behavioral Health Fellow    NOTE: Treatment plan due within first 3 sessions.  Diagnostic assessment due at next session in 2 weeks.

## 2021-12-01 NOTE — PROGRESS NOTES
Preceptor Attestation:   Patient seen, evaluated and discussed with the resident. I have verified the content of the note, which accurately reflects my assessment of the patient and the plan of care.   Supervising Physician:  Ariela Jamison, DO

## 2021-12-09 NOTE — PATIENT INSTRUCTIONS
It was a pleasure to work with you today Jerson!    The homework and goals we discussed today:  1) Create an evening checklist for tasks and divide up your living space into sections.    Our next scheduled appointment(s): 12/14/2021 @ 8:20 AM in clinic    If you need to change a future appointment for any reason, the most efficient way to do so is to call the  at [phone].    Natalie Mejia Psy.D.  they/them/theirs  Primary Care Behavioral Health Fellow  ^^^^^^^^^^^^^^^^^^^^^^^^^^^  Your safety as well as the safety of those around you is important to us. Should you or someone else be in need of them, here are some additional resources:    Feeling overwhelmed and just need a supportive person to talk through a struggling time? (hours 12 PM - 10 PM CST)  Toll Free [phone] or text  Support  to [address]  The [place] Warmline provides a [id number] approach to mental health recovery, support and wellness. Calls are answered by professionally trained Certified Peer Specialists, who have first hand experience living with a mental health condition. (hours 12 PM - 10 PM CST)    Do you feel like you might be in crisis and potentially need professional services?   [facility] Adult Mental Health Crisis:  [phone]  [facility] Childrens Mental Health Crisis: [phone]    [facility] Adult Mental Health Crisis: [phone]  [facility] Children's Mental Health Crisis: [phone]    [facility] Multilingual Crisis Line:  [phone]     Crisis Text Line: People who text [place] to 258288 will be connected with a counselor.     [place] Domestic Violence Crisis Line (24-hour [place] crisis line) 1-317.308.7893    Find a local Alcohol or Narcotics Anonymous meeting:  Https://aam[place].org/  https://www.nam[place].org/    If you feel at risk of immediate harm, call 9-1-1 or go directly to the Emergency Department.  ^^^^^^^^^^^^^^^^^^^^^^^^^^^^^^

## 2021-12-14 ENCOUNTER — OFFICE VISIT (OUTPATIENT)
Dept: PSYCHOLOGY | Facility: CLINIC | Age: 26
End: 2021-12-14
Payer: COMMERCIAL

## 2021-12-14 DIAGNOSIS — F41.1 GENERALIZED ANXIETY DISORDER: Chronic | ICD-10-CM

## 2021-12-14 DIAGNOSIS — F64.0 GENDER DYSPHORIA IN ADOLESCENT AND ADULT: Chronic | ICD-10-CM

## 2021-12-14 DIAGNOSIS — F33.1 MAJOR DEPRESSIVE DISORDER, RECURRENT EPISODE, MODERATE (H): Primary | Chronic | ICD-10-CM

## 2021-12-14 DIAGNOSIS — F40.10 SOCIAL ANXIETY DISORDER: Chronic | ICD-10-CM

## 2021-12-14 PROCEDURE — 90791 PSYCH DIAGNOSTIC EVALUATION: CPT | Mod: HN | Performed by: STUDENT IN AN ORGANIZED HEALTH CARE EDUCATION/TRAINING PROGRAM

## 2021-12-14 ASSESSMENT — ANXIETY QUESTIONNAIRES
5. BEING SO RESTLESS THAT IT IS HARD TO SIT STILL: SEVERAL DAYS
7. FEELING AFRAID AS IF SOMETHING AWFUL MIGHT HAPPEN: NOT AT ALL
3. WORRYING TOO MUCH ABOUT DIFFERENT THINGS: NOT AT ALL
1. FEELING NERVOUS, ANXIOUS, OR ON EDGE: NOT AT ALL
IF YOU CHECKED OFF ANY PROBLEMS ON THIS QUESTIONNAIRE, HOW DIFFICULT HAVE THESE PROBLEMS MADE IT FOR YOU TO DO YOUR WORK, TAKE CARE OF THINGS AT HOME, OR GET ALONG WITH OTHER PEOPLE: SOMEWHAT DIFFICULT
2. NOT BEING ABLE TO STOP OR CONTROL WORRYING: NOT AT ALL
4. TROUBLE RELAXING: SEVERAL DAYS
GAD7 TOTAL SCORE: 2
6. BECOMING EASILY ANNOYED OR IRRITABLE: NOT AT ALL
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
## 2021-12-14 NOTE — PATIENT INSTRUCTIONS
It was a pleasure to work with you today Jerson!    The homework and goals we discussed today:  1) consider goal for therapy    Our next scheduled appointment(s): 12/27/2021 @ 1:20 PM in clinic     If you need to change a future appointment for any reason, the most efficient way to do so is to call the  at [phone].    Natalie Mejia Psy.D.  they/them/theirs  Primary Care Behavioral Health Fellow  ^^^^^^^^^^^^^^^^^^^^^^^^^^^  Your safety as well as the safety of those around you is important to us. Should you or someone else be in need of them, here are some additional resources:    Feeling overwhelmed and just need a supportive person to talk through a struggling time? (hours 12 PM - 10 PM CST)  Toll Free [phone] or text  Support  to 77024  The [place] Warmline provides a llyp-fe-ltun approach to mental health recovery, support and wellness. Calls are answered by professionally trained Certified Peer Specialists, who have first hand experience living with a mental health condition. (hours 12 PM - 10 PM CST)    Do you feel like you might be in crisis and potentially need professional services?   [facility] Adult Mental Health Crisis:  [phone]  [facility] Childrens Mental Health Crisis: [phone]    [facility] Adult Mental Health Crisis: [phone]  [facility] Children's Mental Health Crisis: [phone]    [facility] Multilingual Crisis Line:  [phone]     Crisis Text Line: People who text [place] to 133845 will be connected with a counselor.     [place] Domestic Violence Crisis Line (24-hour [place] crisis line) 2-986-460-6767    Find a local Alcohol or Narcotics Anonymous meeting:  [email or web address].[email or web address]  [email or web address].[place].org/    If you feel at risk of immediate harm, call 9-1-1 or go directly to the Emergency Department.  ^^^^^^^^^^^^^^^^^^^^^^^^^^^^^^

## 2021-12-20 ASSESSMENT — PATIENT HEALTH QUESTIONNAIRE - PHQ9: SUM OF ALL RESPONSES TO PHQ QUESTIONS 1-9: 4

## 2021-12-21 ASSESSMENT — ANXIETY QUESTIONNAIRES: GAD7 TOTAL SCORE: 2

## 2022-01-04 ENCOUNTER — OFFICE VISIT (OUTPATIENT)
Dept: PSYCHOLOGY | Facility: CLINIC | Age: 27
End: 2022-01-04
Payer: COMMERCIAL

## 2022-01-04 DIAGNOSIS — F40.10 SOCIAL ANXIETY DISORDER: Chronic | ICD-10-CM

## 2022-01-04 DIAGNOSIS — F33.1 MAJOR DEPRESSIVE DISORDER, RECURRENT EPISODE, MODERATE (H): Primary | Chronic | ICD-10-CM

## 2022-01-04 DIAGNOSIS — F64.0 GENDER DYSPHORIA IN ADOLESCENT AND ADULT: Chronic | ICD-10-CM

## 2022-01-04 DIAGNOSIS — F41.1 GENERALIZED ANXIETY DISORDER: Chronic | ICD-10-CM

## 2022-01-04 PROCEDURE — 90832 PSYTX W PT 30 MINUTES: CPT | Mod: HN | Performed by: STUDENT IN AN ORGANIZED HEALTH CARE EDUCATION/TRAINING PROGRAM

## 2022-01-04 NOTE — PATIENT INSTRUCTIONS
It was a pleasure to work with you today Ejrson!    The homework and goals we discussed today:  1) daily task and cleaning list    If you have not already, please schedule our next appointment for 3 week(s) with the  at [phone].    If you need to change a future appointment for any reason, the most efficient way to do so is to call the  at [phone].    Natalie Mejia, Ave.  they/them/theirs  Primary Care Behavioral Health Fellow  ^^^^^^^^^^^^^^^^^^^^^^^^^^^  Your safety as well as the safety of those around you is important to us. Should you or someone else be in need of them, here are some additional resources:    Feeling overwhelmed and just need a supportive person to talk through a struggling time? (hours 12 PM - 10 PM CST)  Toll Free [phone] or text  Support  to 49602  The [place] Warmline provides a bsgz-nn-oggn approach to mental health recovery, support and wellness. Calls are answered by professionally trained Certified Peer Specialists, who have first hand experience living with a mental health condition. (hours 12 PM - 10 PM CST)    Do you feel like you might be in crisis and potentially need professional services?   [facility] Adult Mental Health Crisis:  [phone]  [facility] Childrens Mental Health Crisis: [phone]    [facility] Adult Mental Health Crisis: [phone]  [facility] Children's Mental Health Crisis: [phone]    [facility] Multilingual Crisis Line:  [phone]     Crisis Text Line: People who text [place] to 513957 will be connected with a counselor.     [place] Domestic Violence Crisis Line (24-hour [place] crisis line) 1-413.163.1135    Find a local Alcohol or Narcotics Anonymous meeting:  [email or web address].[email or web address]  [email or web address].nam[place].org/    If you feel at risk of immediate harm, call 9-1-1 or go directly to the Emergency Department.  ^^^^^^^^^^^^^^^^^^^^^^^^^^^^^^

## 2022-01-17 ENCOUNTER — TELEPHONE (OUTPATIENT)
Dept: ALLERGY | Facility: CLINIC | Age: 27
End: 2022-01-17
Payer: COMMERCIAL

## 2022-01-21 ENCOUNTER — OFFICE VISIT (OUTPATIENT)
Dept: FAMILY MEDICINE | Facility: CLINIC | Age: 27
End: 2022-01-21
Payer: COMMERCIAL

## 2022-01-21 VITALS
TEMPERATURE: 99.1 F | OXYGEN SATURATION: 97 % | RESPIRATION RATE: 16 BRPM | HEART RATE: 80 BPM | DIASTOLIC BLOOD PRESSURE: 75 MMHG | WEIGHT: 141 LBS | BODY MASS INDEX: 22.47 KG/M2 | SYSTOLIC BLOOD PRESSURE: 128 MMHG

## 2022-01-21 DIAGNOSIS — Z23 NEED FOR PROPHYLACTIC VACCINATION AND INOCULATION AGAINST INFLUENZA: ICD-10-CM

## 2022-01-21 DIAGNOSIS — G43.009 MIGRAINE WITHOUT AURA AND WITHOUT STATUS MIGRAINOSUS, NOT INTRACTABLE: ICD-10-CM

## 2022-01-21 DIAGNOSIS — N64.3 GALACTORRHEA: ICD-10-CM

## 2022-01-21 DIAGNOSIS — F64.9 GENDER DYSPHORIA: Primary | ICD-10-CM

## 2022-01-21 PROBLEM — G43.909 MIGRAINE HEADACHE: Status: ACTIVE | Noted: 2022-01-08

## 2022-01-21 PROCEDURE — 90686 IIV4 VACC NO PRSV 0.5 ML IM: CPT | Performed by: STUDENT IN AN ORGANIZED HEALTH CARE EDUCATION/TRAINING PROGRAM

## 2022-01-21 PROCEDURE — 90471 IMMUNIZATION ADMIN: CPT | Performed by: STUDENT IN AN ORGANIZED HEALTH CARE EDUCATION/TRAINING PROGRAM

## 2022-01-21 PROCEDURE — 99214 OFFICE O/P EST MOD 30 MIN: CPT | Mod: 25 | Performed by: STUDENT IN AN ORGANIZED HEALTH CARE EDUCATION/TRAINING PROGRAM

## 2022-01-21 ASSESSMENT — ANXIETY QUESTIONNAIRES
6. BECOMING EASILY ANNOYED OR IRRITABLE: NOT AT ALL
5. BEING SO RESTLESS THAT IT IS HARD TO SIT STILL: NOT AT ALL
7. FEELING AFRAID AS IF SOMETHING AWFUL MIGHT HAPPEN: NOT AT ALL
2. NOT BEING ABLE TO STOP OR CONTROL WORRYING: NOT AT ALL
IF YOU CHECKED OFF ANY PROBLEMS ON THIS QUESTIONNAIRE, HOW DIFFICULT HAVE THESE PROBLEMS MADE IT FOR YOU TO DO YOUR WORK, TAKE CARE OF THINGS AT HOME, OR GET ALONG WITH OTHER PEOPLE: NOT DIFFICULT AT ALL
3. WORRYING TOO MUCH ABOUT DIFFERENT THINGS: NOT AT ALL
GAD7 TOTAL SCORE: 1
1. FEELING NERVOUS, ANXIOUS, OR ON EDGE: NOT AT ALL

## 2022-01-21 ASSESSMENT — PATIENT HEALTH QUESTIONNAIRE - PHQ9
5. POOR APPETITE OR OVEREATING: SEVERAL DAYS
SUM OF ALL RESPONSES TO PHQ QUESTIONS 1-9: 6

## 2022-01-21 NOTE — PATIENT INSTRUCTIONS
Patient Education   Here is the plan from today's visit    1. Need for prophylactic vaccination and inoculation against influenza  - INFLUENZA VACCINE IM > 6 MONTHS VALENT IIV4 (AFLURIA/FLUZONE)    2. Gender dysphoria  Call  to schedule lab only visit any time after Feb 11. Reach out if you need T refills before that.     3. Galactorrhea  Get mammogram. Afterward, proceed with plastics referral for top surgery consult.   - MA Diagnostic Digital Bilateral; Future    4. Migraine  - Get new glasses when able  - Fill out headache diary as able  - Take Excedrin at onset of headache  - MyChart me if nausea worsens to point of vomiting  - Also reach out if any other new/concerning aspects of headache     Thank you for coming to [facility] today.  Lab Testing:  **If you had lab testing today and your results are reassuring or normal they will be mailed to you or sent through [email or web address] within 7 days.   **If the lab tests need quick action we will call you with the results.  **If you are having labs done on a different day, please call [phone] to schedule at [facility] or [phone] for other [facility] Outpatient Lab locations. Labs do not offer walk-in appointments.  The phone number we will call with results is # [phone] (home) . If this is not the best number please call our clinic and change the number.  Medication Refills:  If you need any refills please call your pharmacy and they will contact us.   If you need to  your refill at a new pharmacy, please contact the new pharmacy directly. The new pharmacy will help you get your medications transferred faster.   Scheduling:  If you have any concerns about today's visit or wish to schedule another appointment please call our office during normal business hours [phone] (8-5:00 M-F)  If a referral was made to an [facility] specialty provider and you do not get a call from central scheduling, please refer to directions on  your visit summary or call our office during normal business hours for assistance.   If a Mammogram was ordered for you at the [facility] Center call [phone] to schedule or change your appointment.  If you had an XRay/CT/Ultrasound/MRI ordered the number is [phone] to schedule or change your radiology appointment.   [facility] has limited ultrasound appointments available on Wednesdays, if you would like your ultrasound at [facility], please call [phone] to schedule.   Medical Concerns:  If you have urgent medical concerns please call [phone] at any time of the day.    Hanna Brand MD

## 2022-01-21 NOTE — PROGRESS NOTES
"[facility]  [facility]     Assessment and Plan   Jerson ANNABELLA Munoz is a 26 year old who presented to clinic today for the following issues:     Migraine headaches  - New glasses  - Fill out headache diary as able  - Take excedrin at onset of headache  - MyChart me if nausea worsens to point of vomiting at which point we can prescribe Zofran     [place] allergy  Chronic. Saw allergist 11/23/21. At that time, plan was referral to [facility] for allergy shots. This hasn't yet been scheduled, but patient has reached out.     MDD, Anxiety, Familial conflict, ADHD?  Reviewed notes w/ Dr. Mejia. Jerson reports one diagnosis that resonates with him is persistent depressive disorder.     Didn't close the loop on concerns re: ADHD. At our last visit, he told me he was diagnosed w/ ADHD In 8th grade in [place]. He was going to try to track down that paperwork, but I'm unsure if he did. If not, I can certainly place referral for ADHD evaluation. (My thoughts at this point are that the symptoms he describes are more related to anxiety than ADHD, but a comprehensive evaluation would be fair.) Has tried wellbutrin in the past, which made him \"uselessly drowsy,\" Not sure if he's tried atomexetine/straterra-- he was going to ask his mom, and I'm not sure if he did or not..     Note: Per Dr. Caro Barnes (1/25/2016: \"Currently, he takes Zoloft 200mg to treat the depression and Adderall 20mg to manage his ADHD symptoms. Melia Galloway MD (his mother) prescribes his medications.\"  - Jerson asking mom if he's tried straterra/atomoxetine in the past  - Continue buspar 10mg daily and sertraline 150mg daily (refilled today)   - Continue CBT w/ Dr. Mejia  - Placed ADHD eval referral last visit -- seems this hasn't been scheduled    Masculinizing hormone therapy since Nov 2020  Gender dysphoria  Labs 11/11/21 okay-- CMP normal, Hb normal. LDL slightly elevated at 122. Testosterone 122 from 297 " "in August. Clinically masculinizing in some areas (more muscular, hair line rounding), not in others (voice, \"soft and squishy,\" breasts. At last visit, increased from 2 pumps t gel daily to 3 pumps t gel daily.   - Ordered mammogram  - Placed plastics referral to [facility] for top surgery  - Follow-up in 3 months   - Discussed diet/exercise recs w/ respect to elevated LDL  - Order labs now; get lab only visit in February.  Refill T after labs result.       Galactorrhea  When screening for breast-related concerns prior to plastics referral, Jerson answered that he does have bilateral milky discharge when he expresses his nipples. Has been going on for years and he figured it was normal or related to meds. He is not on meds that typically cause galactorrhea. TSH and prolactin normal. Unclear etiology right now; low concern given bilateral nature; milky quality; and normal prolactin and TSH. Jerson declined breast exam today.   - Assess further at follow-up. Offer breast exam  - Mammo ordered for top surgery [facility] (changed to diagnostic mammo today given h/o galactorrhea)    Health maintenance  Needs pap smear. He states he'll get it after he's had top surgery.   Declined HIV and HepC screening-- reasonable a he has never used IV drugs and never had any penetrative sex.     Follow-up with me in 3 months re: masculinizing hormone therapy and other issues above.         HPI     Headaches recently.   Works nights.   Last two Saturdays headaches so severe they haven't responded tylenol/ibuprofen combo and one of the times he went home five hours early.   Frequency: \"Frequently.\" Rehydrating made me feel a bit more nauseated. Hard to say how many times per week.   Duration: a couple hours up to two or three full days.   Denies auras.   Sometimes on the left; sometimes on the right. Not sure if it switches midway through, or if it is on one side for one headache and one side for the other headache.   Used to think it was a " "consequence of allergies-- has been trying to take spray consistently as directed.   The main thing that has changed since headaches have become more frequent is that testosterone dose was increased.   Noticed frequency of headaches has been increasing--started about a month ago. At first thought it was related to allegra (new med), so stopped that; but headaches didn't get better. Might try allegra again.   No periods since November-- prior to that, was getting them every 4-6 weeks.   Headaches not waking him up from sleep.   No blurred vision or double vision.   Does need new glasses.   Hydration: Drinks 16-18oz water at work nightly. Sometimes finishes 16 oz coffee. Trying to grab extra water bottle too. Up to 48oz fluid total.   Caffeine intake: up to 16oz coffee. Sometimes phani bar w/ caffeine.     Since increasing T,   Decreased libido, no more periods, more facial hair, and more muscular.     Patient goals today:   - Get refill testosterone  - Maybe get adderall? (States: used to be on it. Fine at work; notices unable to work when he gets home. \"I live with a hoarder.\" Headaches nearly every day lately--thinks cleaning room would help, but having trouble focusing on that.)     Gender:   - Voice is most bothersome.   - \"I'm soft and squishy.\" Look childish.   - Hair line was very round; testosterone has helped with that and very happy with that.   - Getting more muscle in upper arms, which he's happy about.   - Would like top surgery. No family h/o breast cancer. Has milky discharge from both breasts if he squeezes at it-- has been going on for years. Thought it was med side effect. No other related concerns.     From last visit 8/10, still relevant  Working overnights at a shelter program. Going pretty well.   Went to a bit of college, but left for mental health reasons.   Lives with mom.   Has never had penile-vaginal intercourse.   Has had nonconsensual receptive oral intercourse.    Mom's hoarding is a " major source of stress.   Not currently sexually active or at all in the past year.   Hard to get to sleep because  A million things to do before being able to sleep. Once he tries to sleep, he can fall asleep without problem.     Gender affirmation is being sought in these other ways: mom is supportive.     On testosterone since Nov 2020.       Physical exam:  /75   Pulse 80   Temp 99.1  F (37.3  C) (Oral)   Resp 16   Wt 64 kg (141 lb)   LMP  (LMP Unknown)   SpO2 97%   Breastfeeding No   BMI 22.47 kg/m    Mood and affect are anxious.   Decent eye contact.   Casually groomed.   Insight and judgement are fair.   Speech is normal in volume, content, and pace.   Balance intact.  strength symmetric.   EOMi. PERRLA.   Patellar reflexes brisk and symmetric.   Sensation intact to light touch of BLLE.   No gross neuro deficits.

## 2022-01-21 NOTE — PROGRESS NOTES
Preceptor Attestation:   Patient seen, evaluated and discussed with the resident. I have verified the content of the note, which accurately reflects my assessment of the patient and the plan of care.   Supervising Physician:  Clifford Murray MD

## 2022-01-24 NOTE — PROGRESS NOTES
Behavioral Health Progress Note    Client's Legal Name: Jerson Munoz    Client's Preferred Name: Jerson  YOB: 1995  Type of Service: in-person, counseling  Length of Service:   Start time: 9:55 AM    End time: 10:20 AM   Duration: 25 minutes  Attendees: patient    Identifying Information and Presenting Problem:  Jerson (he/him is a 26 year old transmasculine queer adult who was referred for mental health services for help with problematic symptoms of overwhelm, occasional worry, and depressed mood. Much of his distress is related to chronic disorganization, difficulty initiating and completing tasks, as well as difficult situation at home with mother who struggles with hoarding.    Treatment Objective(s) Addressed in This Session:  Psychodiagnostic feedback, psychoeducation, treatment goals  Depressed Mood: Increase interest, engagement, and pleasure in doing things  Attention Problems: will develop coping skills to effectively manage attention issues    Progress on / Status of Treatment Objective(s) / Homework:  No improvement     Mental Health Screening Questionnaires:  PHQ-9:   PHQ 8/10/2021 12/14/2021 1/21/2022   PHQ-9 Total Score 3 4 6   Q9: Thoughts of better off dead/self-harm past 2 weeks Not at all Not at all Not at all      NOAH-7:   NOAH-7 SCORE 3/24/2020 8/10/2021 12/14/2021   Total Score 4 5 2     Topics Discussed/Interventions Provided:  Short session today as Jerson was late to session. We discussed what happened to make him late, Jerson slept past through his alarm. Completed feedback from psychodiagnostic assessment completed at last session. Patient was in agreement with the diagnoses given. Patient and provider explored the pros and cons of getting reevaluated for ADHD as he is unable to access the records from being diagnosed as a kid. Generally Jerson is interested in getting reevaluated because he felt better able to focus when taking ADHD medication. Provider agreed that a  "portion of his current presentation overlaps with ADHD. Patient and provider discussed Jerson's goals for himself which related to being more organized, having better health behaviors, and being able to manage his mental health more proactively. Jerson has gotten some benefit from having a daily task list and breaking cleaning spaces down into zones that get a bit of attention on their designated day. Provided some education about habits and how to use daily task lists to creat unconscious habits.    Assessment:   The patient appeared to be active and engaged in today's session and was receptive to feedback. He continues to be some what hesitant to engage with therapy despite having some goals for himself. It is likely that ADHD being treated would help with reported symptoms; however, it would be useful for him to have some behavioral tools also.    Mental Status:   During interaction with the examiner today, Jerson was cooperative, easy to engage with, respectful and appeared to minimize symptoms. Patient was generally alert and oriented to person, place, time, and situation. They were casually dressed, well groomed and appeared stated age. Patient's attire was appropriate for the weather and occasion. Eye contact was fair. Psychomotor functioning: fidgety. Speech was normal limits tone, rate, volume; largely coherent and relevant to topic. Mood was \"okay\" and depressed; affect was mood-congruent. Thought processes were unremarkable. Thought content was not remarkable with no evidence of psychotic features and no evidence of suicide, homicide, or nonsuicidal self injury related thoughts, intent, urges, planning, behavior/recent attempts. Memory appeared grossly intact without being formally evaluated. Insight: adequate. Judgment was good. Patient exhibited good impulse control during the appointment.      Does the patient appear to be at imminent risk of harm to self/others at this time? No     The session was " necessary to address symptoms of  that have been interfering with patient's ability to function in areas related to family relationships, maintaining personal health and physical well-being, participating in meaningful activities and household management. Ongoing psychotherapy is necessary to provide counseling and improve functioning with daily activities.    DSM-5 Diagnosis:  296.32 (F33.1) Major Depressive Disorder, recurrent, moderate  300.23 (F40.10) Social Anxiety Disorder  300.02 (F41.1) Generalized Anxiety Disorder  302.85 ( F64.0 ) Gender Dysphoria in Adolescents and Adults  Provisional: Attention Deficit Hyperactivity Disorder, unspecified type  Rule out: Persistent Depressive Disorder, with persistent major depressive episodes    Plan:  1. Follow up with this provider in 3 weeks  2. Work on goals as noted in patient instructions.  3. Utilize crisis resources as needed.  4. After Visit Summary will be reviewed in Micky Mejia Psy.D.  they/them/theirs  Primary Care Behavioral Health Fellow    NOTE: Treatment plan update due next session, was not completed today due to time.  Diagnostic assessment update due 12/14/2022.

## 2022-01-26 NOTE — TELEPHONE ENCOUNTER
Please advise patient that he will need to establish care with me to discuss his treatment plan moving forward. OK to schedule in back to back return slots for a 40 minute new patient visit - video or in person.

## 2022-01-26 NOTE — TELEPHONE ENCOUNTER
Please advise. Patient previously was seen by Dr. Loo. Patient would like to start allergy shots.    Doretha GARCIA MA

## 2022-02-01 NOTE — TELEPHONE ENCOUNTER
Left message for patient to return a call to clinic. Advised that a [employer] message will also be sent with information.    Doretha GARCIA MA

## 2022-02-09 ASSESSMENT — ANXIETY QUESTIONNAIRES: GAD7 TOTAL SCORE: 1

## 2022-02-15 ENCOUNTER — LAB (OUTPATIENT)
Dept: LAB | Facility: CLINIC | Age: 27
End: 2022-02-15
Payer: COMMERCIAL

## 2022-02-15 ENCOUNTER — OFFICE VISIT (OUTPATIENT)
Dept: PSYCHOLOGY | Facility: CLINIC | Age: 27
End: 2022-02-15
Payer: COMMERCIAL

## 2022-02-15 DIAGNOSIS — F64.9 GENDER DYSPHORIA: ICD-10-CM

## 2022-02-15 DIAGNOSIS — F64.0 GENDER DYSPHORIA IN ADOLESCENT AND ADULT: ICD-10-CM

## 2022-02-15 DIAGNOSIS — F33.1 MAJOR DEPRESSIVE DISORDER, RECURRENT EPISODE, MODERATE (H): Primary | ICD-10-CM

## 2022-02-15 DIAGNOSIS — F40.10 SOCIAL ANXIETY DISORDER: ICD-10-CM

## 2022-02-15 DIAGNOSIS — F41.1 GENERALIZED ANXIETY DISORDER: ICD-10-CM

## 2022-02-15 LAB
ALBUMIN SERPL-MCNC: 4.1 G/DL (ref 3.4–5)
ALP SERPL-CCNC: 71 U/L (ref 40–150)
ALT SERPL W P-5'-P-CCNC: 22 U/L (ref 0–70)
AST SERPL W P-5'-P-CCNC: 14 U/L (ref 0–45)
BILIRUB DIRECT SERPL-MCNC: <0.1 MG/DL (ref 0–0.2)
BILIRUB SERPL-MCNC: 0.8 MG/DL (ref 0.2–1.3)
CHOLEST SERPL-MCNC: 234 MG/DL
FASTING STATUS PATIENT QL REPORTED: YES
FASTING STATUS PATIENT QL REPORTED: YES
GLUCOSE BLD-MCNC: 76 MG/DL (ref 70–99)
HDLC SERPL-MCNC: 54 MG/DL
HGB BLD-MCNC: 15.2 G/DL (ref 11.7–17.7)
LDLC SERPL CALC-MCNC: 138 MG/DL
NONHDLC SERPL-MCNC: 180 MG/DL
PROT SERPL-MCNC: 7.4 G/DL (ref 6.8–8.8)
TRIGL SERPL-MCNC: 211 MG/DL

## 2022-02-15 PROCEDURE — 36415 COLL VENOUS BLD VENIPUNCTURE: CPT

## 2022-02-15 PROCEDURE — 80061 LIPID PANEL: CPT

## 2022-02-15 PROCEDURE — 84403 ASSAY OF TOTAL TESTOSTERONE: CPT

## 2022-02-15 PROCEDURE — 85018 HEMOGLOBIN: CPT

## 2022-02-15 PROCEDURE — 90834 PSYTX W PT 45 MINUTES: CPT | Mod: HN | Performed by: STUDENT IN AN ORGANIZED HEALTH CARE EDUCATION/TRAINING PROGRAM

## 2022-02-15 PROCEDURE — 80076 HEPATIC FUNCTION PANEL: CPT

## 2022-02-15 PROCEDURE — 82947 ASSAY GLUCOSE BLOOD QUANT: CPT

## 2022-02-15 NOTE — PATIENT INSTRUCTIONS
It was a pleasure to work with you today Jerson!    The homework and goals we discussed today:  1) [email or web address]-reasons-[email or web address]-[email or web address]    Our next scheduled appointment(s): 3/1/2022 @ 8:20 AM in clinic    If you need to change a future appointment for any reason, the most efficient way to do so is to call the  at [phone].    Natalie Mejia Psy.D.  they/them/theirs  Primary Care Behavioral Health Fellow  ^^^^^^^^^^^^^^^^^^^^^^^^^^^  Your safety as well as the safety of those around you is important to us. Should you or someone else be in need of them, here are some additional resources:    Feeling overwhelmed and just need a supportive person to talk through a struggling time? (hours 12 PM - 10 PM CST)  Toll Free [phone] or text  Support  to 93907  The [place] Warmline provides a hkiy-ik-gisp approach to mental health recovery, support and wellness. Calls are answered by professionally trained Certified Peer Specialists, who have first hand experience living with a mental health condition. (hours 12 PM - 10 PM CST)    Do you feel like you might be in crisis and potentially need professional services?   [facility] Adult Mental Health Crisis:  [phone]  [facility] Children's Mental Health Crisis: [phone]    [facility] Adult Mental Health Crisis: [phone]  [facility] Children's Mental Health Crisis: [phone]    [facility] Multilingual Crisis Line:  [phone]     Crisis Text Line: People who text [place] to 261322 will be connected with a counselor.     [place] Domestic Violence Crisis Line (24-hour [place] crisis line) 1-792.195.8879    Find a local Alcohol or Narcotics Anonymous meeting:  Https://aam[place].org/  https://www.nam[place].org/    If you feel at risk of immediate harm, call 9-1-1 or go directly to the Emergency Department.  ^^^^^^^^^^^^^^^^^^^^^^^^^^^^^^  Treatment Plan    Client's Legal  Name: Jerson Munoz    Client's Preferred Name: Jerson  YOB: 1995  Today's Date: February 15, 2022    Next Treatment Plan Update Due: 12/14/2022  Next Diagnostic Update Due: 5/15/2022    DSM-V Diagnoses:  296.32 (F33.1) Major Depressive Disorder, recurrent, moderate  300.23 (F40.10) Social Anxiety Disorder  300.02 (F41.1) Generalized Anxiety Disorder  302.85 ( F64.0 ) Gender Dysphoria in Adolescents and Adults  Rule out: Persistent Depressive Disorder, with persistent major depressive episodes    Psychosocial/Contextual Factors:  Jerson (he/him is a 26 year old transmasculine queer adult who was referred for mental health services for help with problematic symptoms of overwhelm, occasional worry, and depressed mood. Much of his distress is related to chronic disorganization, difficulty initiating and completing tasks, as well as difficult situation at home with mother who struggles with hoarding.    Functioning:  Jerson's mental health concerns have been continuing to affect his ability to function in areas related to family relationships, maintaining personal health and physical well-being, participating in meaningful activities and household management causing clinically significant distress.    Mental Health Screening Questionnaires:  PHQ-9:   PHQ 12/14/2021 1/21/2022 3/1/2022   PHQ-9 Total Score 4 6 7   Q9: Thoughts of better off dead/self-harm past 2 weeks Not at all Not at all Not at all      NOAH-7:   NOAH-7 SCORE 12/14/2021 1/21/2022 3/1/2022   Total Score 2 1 3     PC-PTSD Screen:   PTSD Screen Score 12/14/2021   Have you ever experienced this kind of event? Yes   PTSD Screen (Score of 3 or more suggests positive screen) 0   Some recent data might be hidden     CAGE-AID:   CAGE-AID Total Score 1/22/2016 3/8/2017 12/14/2021   Total Score 2 3 0       Collaboration:  Hanna Brand    Referral:  Jerson is interested in getting retested for ADHD    Anticipated treatment duration:  Unknown  Agreed upon meeting frequency: every 2-3 weeks    Long Term Treatment Goal(s) related to diagnosis / functional impairment:  Goal 1: Jerson would like to be better able to manage symptoms of overwhelm, difficulty completing tasks, and depressed mood.    Steps we will take to achieve your goal:    Patient will learn to identify, monitor, challenge, and change problematic patterns of behavior and thought that contribute to psychological suffering. Through the introduction of thought challenging, mindfulness, and/or acceptance based skills they will engage with their symptoms in a healthier way.    Intervention(s):  Therapist will provide support, psychoeducation and homework assignments as needed.    Goal 2: Jerson would like to be better able to communicate with others including family and providers about current stressors.    Steps we will take to achieve your goal:    Patient will learn interpersonal effectiveness as well as communication skills and gain confidence in their use through in-session and between-session practice.    Intervention(s):  Therapist will provide support, psychoeducation and homework assignments as needed.    If you need additional support and care during times that your therapist or PCP are not available, here are some additional resources for you:    COPE ([facility] Mobile Response Team):  [phone]   [facility] Multilingual Crisis Line:  [phone]    [facility]: [phone]    (Emergency Psychiatric Evaluation)    Acute Psychiatric Services - [facility]  24 hour crisis walk-in and crisis line  [address], [place]  [phone]    Crisis Text Line: Text [place] to 114544. Free support at your fingertips 24/7  People who text [place] to [phone] will be connected with a counselor. Crisis Text Line is available 24 hours a day, seven days a week.    If you feel at risk of immediate harm, go directly to the Emergency Department.    Patient  has reviewed and agreed to  the above plan.    Natalie Mejia PsyD  February 15, 2022

## 2022-02-15 NOTE — PROGRESS NOTES
Behavioral Health Progress Note    Client's Legal Name: Jerson Munoz    Client's Preferred Name: Jerson  YOB: 1995  Type of Service: in-person, counseling  Length of Service:   Start time: 8:20 AM    End time: 8:58 AM  Duration: 38 minutes  Attendees: patient    Identifying Information and Presenting Problem:  Jerson (he/him is a 26 year old transmasculine queer adult who was referred for mental health services for help with problematic symptoms of overwhelm, occasional worry, and depressed mood. Much of his distress is related to chronic disorganization, difficulty initiating and completing tasks, as well as difficult situation at home with mother who struggles with hoarding.    Treatment Objective(s) Addressed in This Session:  Creation of treatment plan:  Goal 1: Jerson would like to be better able to manage symptoms of overwhelm, difficulty completing tasks, and depressed mood.  Goal 2: Jerson would like to be better able to communicate with others including family and providers about current stressors.    Progress on / Status of Treatment Objective(s) / Homework:  Stable     Mental Health Screening Questionnaires:  PHQ-9:   PHQ 8/10/2021 12/14/2021 1/21/2022   PHQ-9 Total Score 3 4 6   Q9: Thoughts of better off dead/self-harm past 2 weeks Not at all Not at all Not at all      NOAH-7:   NOAH-7 SCORE 8/10/2021 12/14/2021 1/21/2022   Total Score 5 2 1     Topics Discussed/Interventions Provided:  Provided solution focused/behavioral activation therapy starting session today with Jerson providing several updates from the past two weeks. Jerson has been struggling the last couple of weeks sharing that he is feeling rather overwhelmed at home. Much of today's intervention explored Jerson's difficulty accepting that mom is the one who needs to get care for her hoarding. Jerson voiced fear that she will not engage with a provider and a sense of obligation to continue assisting with cleaning up around  "the house. Provided empathy and a psychoeducation about how ongoing environmental stressors can negatively impact our mental health. Provided motivational interviewing related to creating a task list which was something Jerson had benefited from previously.    Assessment:   The patient appeared to be active and engaged in today's session and was receptive to feedback.    Mental Status:   During interaction with the examiner today, Jerson was cooperative, easy to engage with, respectful and tearful at times. Patient was generally alert and oriented to person, place, time, and situation. They were casually dressed, well groomed and appeared stated age. Patient's attire was appropriate for the weather and occasion. Eye contact was fair. Psychomotor functioning: fidgety. Speech was normal limits tone, rate, volume; largely coherent and relevant to topic. Mood was \"okay\" and depressed; affect was broad, congruent to topic. Thought processes were unremarkable. Thought content was not remarkable with no evidence of psychotic features and no evidence of suicide, homicide, or nonsuicidal self injury related thoughts, intent, urges, planning, behavior/recent attempts. Memory appeared grossly intact without being formally evaluated. Insight: adequate. Judgment was good. Patient exhibited good impulse control during the appointment    Does the patient appear to be at imminent risk of harm to self/others at this time? No    The session was necessary to address symptoms of  that have been interfering with patient's ability to function in areas related to family relationships, maintaining personal health and physical well-being, participating in meaningful activities and household management. Ongoing psychotherapy is necessary to provide counseling and improve functioning with daily activities.     DSM-5 Diagnosis:  296.32 (F33.1) Major Depressive Disorder, recurrent, moderate  300.23 (F40.10) Social Anxiety Disorder  300.02 " (F41.1) Generalized Anxiety Disorder  302.85 ( F64.0 ) Gender Dysphoria in Adolescents and Adults  Rule out: Persistent Depressive Disorder, with persistent major depressive episodes    Plan:  1. Follow up with this provider 3/1/2022 @ 8:20 AM in clinic  2. Work on goals as noted in patient instructions.  3. Utilize crisis resources as needed.  4. After Visit Summary will be reviewed in Micky Mejia Psy.D.  they/them/theirs  Primary Care Behavioral Health Fellow    NOTE: Treatment plan update due 5/15/2022.  Diagnostic assessment update due 12/14/2022.

## 2022-02-16 NOTE — RESULT ENCOUNTER NOTE
Dr. Sunday Arthur is out of the office, but I wanted to communicate these results to you.     Your hemoglobin, glucose, and hepatic function are all normal. These tell us about your blood levels, blood sugar, and liver function. We are still waiting for your T levels to come back, typically they take a little longer.     Your lipid results are abnormal on 2/15/22, as they were last time we checked them 11/11/21. We'll need to discuss this finding on your next visit (3/1/22), and consider medication therapy or lifestyle changes.     Until then, I recommend starting some lifestyle changes on your own if you haven't already:   Here is some standardized info on high cholesterol: https://www.[facility].org/patient-education/213056YG    Here's some basic guidelines:   - Aim for 40 minutes of moderate to vigorous physical activity 3 to 4 times a week. Pick activities you enjoy. Walking is a good choice if you want to lose weight.   - In general, a low-cholesterol diet means that you eat less saturated fat (red meat and regular dairy) and less cholesterol each day. You may eat foods with unsaturated fats (vegetable oils, nuts, and seeds). Eat more fruits, vegetables, fish, and whole grains, or other high-fiber foods.    The DASH diet is recommended for people who want to stop hypertension, but it can also be helpful for people who want to lower their cholesterol. Check out the [facility]'s recommendations for this:     https://www.[facility].org/healthy-lifestyle/nutrition-and-healthy-eating/in-depth/dash-diet/art-83060803    Hope all is well. Let me know if you have any questions,   Seble Krueger, DO  Pronouns: she/her/hers  Resident Physician  [facility]/ [facility]    Department of Family Medicine and Community Health

## 2022-02-17 LAB — TESTOST SERPL-MCNC: 504 NG/DL (ref 8–950)

## 2022-03-01 ENCOUNTER — OFFICE VISIT (OUTPATIENT)
Dept: PSYCHOLOGY | Facility: CLINIC | Age: 27
End: 2022-03-01
Payer: COMMERCIAL

## 2022-03-01 DIAGNOSIS — F41.1 GENERALIZED ANXIETY DISORDER: ICD-10-CM

## 2022-03-01 DIAGNOSIS — F33.1 MAJOR DEPRESSIVE DISORDER, RECURRENT EPISODE, MODERATE (H): Primary | ICD-10-CM

## 2022-03-01 DIAGNOSIS — F40.10 SOCIAL ANXIETY DISORDER: ICD-10-CM

## 2022-03-01 DIAGNOSIS — F64.0 GENDER DYSPHORIA IN ADOLESCENT AND ADULT: ICD-10-CM

## 2022-03-01 PROCEDURE — 90834 PSYTX W PT 45 MINUTES: CPT | Mod: HN | Performed by: STUDENT IN AN ORGANIZED HEALTH CARE EDUCATION/TRAINING PROGRAM

## 2022-03-01 ASSESSMENT — ANXIETY QUESTIONNAIRES
1. FEELING NERVOUS, ANXIOUS, OR ON EDGE: NOT AT ALL
6. BECOMING EASILY ANNOYED OR IRRITABLE: NOT AT ALL
5. BEING SO RESTLESS THAT IT IS HARD TO SIT STILL: SEVERAL DAYS
2. NOT BEING ABLE TO STOP OR CONTROL WORRYING: NOT AT ALL
IF YOU CHECKED OFF ANY PROBLEMS ON THIS QUESTIONNAIRE, HOW DIFFICULT HAVE THESE PROBLEMS MADE IT FOR YOU TO DO YOUR WORK, TAKE CARE OF THINGS AT HOME, OR GET ALONG WITH OTHER PEOPLE: SOMEWHAT DIFFICULT
7. FEELING AFRAID AS IF SOMETHING AWFUL MIGHT HAPPEN: NOT AT ALL
GAD7 TOTAL SCORE: 3
3. WORRYING TOO MUCH ABOUT DIFFERENT THINGS: SEVERAL DAYS

## 2022-03-01 ASSESSMENT — PATIENT HEALTH QUESTIONNAIRE - PHQ9
5. POOR APPETITE OR OVEREATING: SEVERAL DAYS
SUM OF ALL RESPONSES TO PHQ QUESTIONS 1-9: 7

## 2022-03-01 NOTE — PATIENT INSTRUCTIONS
It was a pleasure to work with you today Jerson!    The homework and goals we discussed today:  1) Revisit your task/chore list? How can you be more mindful about rest time?  2) Talk with mom about hoarding support resource.    Our next scheduled appointment(s): 3/15/2022 @ 8:20 AM in clinic    If you need to change a future appointment for any reason, the most efficient way to do so is to call the  at [phone].    Natalie Mejia Psy.D.  they/them/theirs  Primary Care Behavioral Health Fellow  ^^^^^^^^^^^^^^^^^^^^^^^^^^^  Your safety as well as the safety of those around you is important to us. Should you or someone else be in need of them, here are some additional resources:    Feeling overwhelmed and just need a supportive person to talk through a struggling time? (hours 12 PM - 10 PM CST)  Toll Free [phone] or text  Support  to [address]01  The [place] Warmline provides a fzaw-fu-zesf approach to mental health recovery, support and wellness. Calls are answered by professionally trained Certified Peer Specialists, who have first hand experience living with a mental health condition. (hours 12 PM - 10 PM CST)    Do you feel like you might be in crisis and potentially need professional services?   [facility] Adult Mental Health Crisis:  [phone]  [facility] Children's Mental Health Crisis: [phone]    [facility] Adult Mental Health Crisis: [phone]  [facility] Children's Mental Health Crisis: [phone]    [facility] Multilingual Crisis Line:  [phone]     Crisis Text Line: People who text [place] to 965185 will be connected with a counselor.     [place] Domestic Violence Crisis Line (24-hour [place] crisis line) 1-300.713.5750    Find a local Alcohol or Narcotics Anonymous meeting:  Https://aam[place].org/  https://www.[place].org/    If you feel at risk of immediate harm, call 9-1-1 or go directly to the Emergency Department.  ^^^^^^^^^^^^^^^^^^^^^^^^^^^^^^

## 2022-03-01 NOTE — PROGRESS NOTES
Behavioral Health Progress Note    Client's Legal Name: Jerson Munoz    Client's Preferred Name: Jerson  YOB: 1995  Type of Service: in-person, counseling  Length of Service:   Start time: 8:22 AM    End time: 9:04 AM   Duration: 42 minutes  Attendees: patient    Identifying Information and Presenting Problem:  Jerson (he/him) is a 26 year old transmasculine queer adult who was referred for mental health services for help with problematic symptoms of overwhelm, occasional worry, and depressed mood. Much of his distress is related to chronic disorganization, difficulty initiating and completing tasks, as well as difficult situation at home with mother who struggles with hoarding.    Treatment Objective(s) Addressed in This Session:  Goal 1: Jerson would like to be better able to manage symptoms of overwhelm, difficulty completing tasks, and depressed mood.  Goal 2: Jerson would like to be better able to communicate with others including family and providers about current stressors.    Progress on / Status of Treatment Objective(s) / Homework:  Stable     Mental Health Screening Questionnaires:  PHQ-9:   PHQ 12/14/2021 1/21/2022 3/1/2022   PHQ-9 Total Score 4 6 7   Q9: Thoughts of better off dead/self-harm past 2 weeks Not at all Not at all Not at all      NOAH-7:   NOAH-7 SCORE 12/14/2021 1/21/2022 3/1/2022   Total Score 2 1 3     Topics Discussed/Interventions Provided:  Provided solution focused/behavioral activation therapy beginning session with Jerson providing several updates from the past two weeks. He found and has been using a CBD product that has been helpful preventing migraines which has enabled him to sleep and focus better. He also found several resources in the [facility] area that specialize in hoarding. He hopes to speak with mom later today about these resources and encourage her to call. Provided empathy as we discussed some other stressors related to his employment potentially  "ending next month. 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 Encouraged him to speak with current supervisors to network for other jobs, get information about terminal leave, and to provide references/review resume. 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    Assessment:   The patient appeared to be active and engaged in today's session and was receptive to feedback.     Mental Status:   During interaction with the examiner today, Jerson was cooperative, easy to engage with, respectful and appeared to minimize symptoms. Patient was generally alert and oriented to person, place, time, and situation. They were casually dressed, well groomed and appeared stated age. Patient's attire was appropriate for the weather and occasion. Eye contact was fair. Psychomotor functioning: fidgety. Speech was normal limits tone, rate, volume; largely coherent and relevant to topic. Mood was \"okay\" and depressed; affect was mood-congruent. Thought processes were unremarkable. Thought content was not remarkable with no evidence of psychotic features and no evidence of suicide, homicide, or nonsuicidal self injury related thoughts, intent, urges, planning, behavior/recent attempts. Memory appeared grossly intact without being formally evaluated. Insight: adequate. Judgment was good. Patient exhibited good impulse control during the appointment.     Does the patient appear to be at imminent risk of harm to self/others at this time? No    The session was necessary to address symptoms of  that have been interfering with patient's ability to function in areas related to family relationships, maintaining personal health and physical well-being, participating in meaningful activities and household management. Ongoing psychotherapy is necessary to provide counseling and improve functioning with daily activities.    DSM-5 Diagnosis:  296.32 " (F33.1) Major Depressive Disorder, recurrent, moderate  300.23 (F40.10) Social Anxiety Disorder  300.02 (F41.1) Generalized Anxiety Disorder  302.85 ( F64.0 ) Gender Dysphoria in Adolescents and Adults  Rule out: Persistent Depressive Disorder, with persistent major depressive episodes    Plan:  1. Follow up with this provider 3/15/2022 @ 8:20 AM in clinic  2. Work on goals as noted in patient instructions.  3. Utilize crisis resources as needed.  4. After Visit Summary will be reviewed in Micky Mejia Psy.D.  they/them/theirs  Primary Care Behavioral Health Fellow    NOTE: Treatment plan update due 5/15/2022.  Diagnostic assessment update due 12/14/2022.

## 2022-03-02 ASSESSMENT — ANXIETY QUESTIONNAIRES: GAD7 TOTAL SCORE: 3

## 2022-03-15 ENCOUNTER — VIRTUAL VISIT (OUTPATIENT)
Dept: PSYCHOLOGY | Facility: CLINIC | Age: 27
End: 2022-03-15
Payer: COMMERCIAL

## 2022-03-15 DIAGNOSIS — F33.1 MAJOR DEPRESSIVE DISORDER, RECURRENT EPISODE, MODERATE (H): Primary | ICD-10-CM

## 2022-03-15 DIAGNOSIS — F41.1 GENERALIZED ANXIETY DISORDER: ICD-10-CM

## 2022-03-15 DIAGNOSIS — F64.0 GENDER DYSPHORIA IN ADOLESCENT AND ADULT: ICD-10-CM

## 2022-03-15 DIAGNOSIS — F40.10 SOCIAL ANXIETY DISORDER: ICD-10-CM

## 2022-03-15 PROCEDURE — 90832 PSYTX W PT 30 MINUTES: CPT | Mod: GT | Performed by: STUDENT IN AN ORGANIZED HEALTH CARE EDUCATION/TRAINING PROGRAM

## 2022-03-15 NOTE — PROGRESS NOTES
Telemedicine Visit: The patient's condition can be safely assessed and treated via synchronous audio and visual telemedicine encounter.      Reason for Telemedicine Visit: Patient has requested telehealth visit and Patient convenience (e.g. access to timely appointments / distance to available provider)    Originating Site (Patient Location): Patient's home    Distant Site (Provider Location): [facility]: [place]    Consent:  The patient/guardian has verbally consented to: the potential risks and benefits of telemedicine (video visit) versus in person care; bill my insurance or make self-payment for services provided; and responsibility for payment of non-covered services.     Mode of Communication:  Video Conference via [employer]    As the provider I attest to compliance with applicable laws and regulations related to telemedicine.    Behavioral Health Progress Note    Client's Legal Name: Jerson Munoz    Client's Preferred Name: Jerson  YOB: 1995  Type of Service: video, counseling  Length of Service:   Start time: 8:24 AM  End time: 8:57 AM  Duration: 33 minutes  Attendees: patient    Identifying Information and Presenting Problem:  Jerson (he/him) is a 26 year old transmasculine queer adult who was referred for mental health services for help with problematic symptoms of overwhelm, occasional worry, and depressed mood. Much of his distress is related to chronic disorganization, difficulty initiating and completing tasks, as well as difficult situation at home with mother who struggles with hoarding.     Treatment Objective(s) Addressed in This Session:  Goal 1: Jerson would like to be better able to manage symptoms of overwhelm, difficulty completing tasks, and depressed mood.  Goal 2: Jerson would like to be better able to communicate with others including family and providers about current stressors.    Progress on / Status of Treatment Objective(s) / Homework:  Stable      Mental Health Screening Questionnaires:  PHQ-9:   PHQ 12/14/2021 1/21/2022 3/1/2022   PHQ-9 Total Score 4 6 7   Q9: Thoughts of better off dead/self-harm past 2 weeks Not at all Not at all Not at all      NOAH-7:   NOAH-7 SCORE 12/14/2021 1/21/2022 3/1/2022   Total Score 2 1 3     Topics Discussed/Interventions Provided:  Provided solution focused/behavioral activation therapy beginning session with Jerson providing several updates from the past two weeks. Jerson is attending a meeting later today about how the contract will end for the organization. He is completing job applications and looking into becoming a . He is also looking forward to going on a family trip to [place]. Will be gone for about a week for a combined memorial ceremony for several family members that have passed away during the pandemic. Listened with empathy and provided validation as we discussed grief. Jerson and his family are struggling with loss of a cousin, his uncle, and his grandmother. We discussed a cope ahead plan for the stress of seeing family.    Patient and provider discussed making some goals and task lists for finishing up job applications materials. We also discussed potentially writing down all of the questions that he has for the upcoming meeting later today because the timeline of when he will be off contract is not clear yet. We created a list of things that provider can ask about next session to build in some accountability.    Assessment:   The patient appeared to be active and engaged in today's session and was receptive to feedback.    Mental Status:   During interaction with the examiner today, Jerson was cooperative, easy to engage with, respectful and appeared to minimize symptoms. Patient was generally alert and oriented to person, place, time, and situation. They were casually dressed, well groomed and appeared stated age. Patient's attire was appropriate for the weather and occasion. Eye contact was fair.  "Psychomotor functioning: fidgety. Speech was normal limits tone, rate, volume; largely coherent and relevant to topic. Mood was \"okay\" and depressed; affect was mood-congruent. Thought processes were unremarkable. Thought content was not remarkable with no evidence of psychotic features and no evidence of suicide, homicide, or nonsuicidal self injury related thoughts, intent, urges, planning, behavior/recent attempts. Memory appeared grossly intact without being formally evaluated. Insight: adequate. Judgment was good. Patient exhibited good impulse control during the appointment.     Does the patient appear to be at imminent risk of harm to self/others at this time? No    The session was necessary to address symptoms of  that have been interfering with patient's ability to function in areas related to family relationships, maintaining personal health and physical well-being, participating in meaningful activities and household management. Ongoing psychotherapy is necessary to provide counseling and improve functioning with daily activities.     DSM-5 Diagnosis:  296.32 (F33.1) Major Depressive Disorder, recurrent, moderate  300.23 (F40.10) Social Anxiety Disorder  300.02 (F41.1) Generalized Anxiety Disorder  302.85 ( F64.0 ) Gender Dysphoria in Adolescents and Adults    Plan:  1. Follow up with this provider 4/5/2022 @ 8:20 AM in clinic  2. Work on goals as noted in patient instructions.  3. Utilize crisis resources as needed.  4. After Visit Summary will be reviewed in Micky Mejia Psy.D.  they/them/theirs  Primary Care Behavioral Health Fellow    NOTE: Treatment plan update due 5/15/2022.  Diagnostic assessment update due 12/14/2022.    "

## 2022-03-15 NOTE — PATIENT INSTRUCTIONS
It was a pleasure to work with you today Jerson!    The homework and goals we discussed today:  1) Create a list of questions for department meeting later today  2) Create some goals and deadlines for job application materials.  3) The priority window https://www.cmas[employer].org/qoo-kgmvhkkwtp-psro-you-have-adhd#:~:text=The%20Matrix%20has%20four%20categories,the%20Important%20and%20Urgent%20box.    Our next scheduled appointment(s): 4/5/2022 @ 8:20 AM in clinic    If you need to change a future appointment for any reason, the most efficient way to do so is to call the  at [phone].    Natalie Mejia Psy.D.  they/them/theirs  Primary Care Behavioral Health Fellow  ^^^^^^^^^^^^^^^^^^^^^^^^^^^  Your safety as well as the safety of those around you is important to us. Should you or someone else be in need of them, here are some additional resources:    Feeling overwhelmed and just need a supportive person to talk through a struggling time? (hours 12 PM - 10 PM CST)  Toll Free [phone] or text  Support  to 21971  The [place] Warmline provides a qmlk-or-qjuc approach to mental health recovery, support and wellness. Calls are answered by professionally trained Certified Peer Specialists, who have first hand experience living with a mental health condition. (hours 12 PM - 10 PM CST)    Do you feel like you might be in crisis and potentially need professional services?   [facility] Adult Mental Health Crisis:  [phone]  [facility] Childrens Mental Health Crisis: [phone]    [facility] [place] [facility] Adult Mental Health Crisis: [phone]  [facility] [place] [facility] Childrens Mental Health Crisis: [phone]    [facility] Multilingual Crisis Line:  [phone]     Crisis Text Line: People who text [place] to 685109 will be connected with a counselor.     [place] Domestic Violence Crisis Line (24-hour [place] crisis line) 1-503.661.9229    Find a local Alcohol or Narcotics Anonymous  meeting:  Https://aam[place].org/  https://www.[place].org/    If you feel at risk of immediate harm, call 9-1-1 or go directly to the Emergency Department.  ^^^^^^^^^^^^^^^^^^^^^^^^^^^^^^

## 2022-04-18 ENCOUNTER — TELEPHONE (OUTPATIENT)
Dept: PSYCHOLOGY | Facility: CLINIC | Age: 27
End: 2022-04-18
Payer: COMMERCIAL

## 2022-04-18 PROCEDURE — 99207 PR NO CHARGE LOS: CPT | Performed by: STUDENT IN AN ORGANIZED HEALTH CARE EDUCATION/TRAINING PROGRAM

## 2022-04-18 NOTE — LETTER
April 18, 2022      Jerson Munoz  [address] [place] [address]        Dear Jerson,    I hope that this letter finds you well. I am writing to you today as we were scheduled for a in-person, counseling appointment 4/5/2022 at 8:20 PM. I was concerned when I could not reach you. I have tried to reach you by phone several times without success which I why I am sending a letter.    I would be pleased to continue these services with you. However, I recognize that individual needs change and many different factors can influence your interest or desire to receive behavioral health services. I would like to make an offer to help address any barriers that make attending these appointments difficult. I encourage folks to reach out on this so we can discuss over phone even if they are not ready to schedule at this time. You are also more than welcome to discuss any concerns with your primary care provider should that be more comfortable. It is also important that you know having too many no-show or late cancellations may result in being unable to schedule with this me until we are able to talk about attendance.    If you have any questions for or would like to schedule this appointment you can do so through the [facility]  at [phone].     Sincerely,        Natalie Mejia Psy.D.  they/them/theirs  Primary Care Behavioral Health Fellow

## 2022-04-18 NOTE — TELEPHONE ENCOUNTER
Attempted to contact Jerson, for outreach after being unable to reach him for in-person counseling appointment with this provider 4/5/2022 at 8:20 AM. This is patient's first no-show or late cancellation with this provider.    Attempted to contact patient by phone number recorded in medical record ending [id number] at 3:30 PM. Patient did not answer.    Left a voicemail message requesting the patient call the [facility]  at [phone] to re-schedule the appointment should he be interested.     Plan:   Primary Care/Referring Provider Dr. Brand will be alerted to this note for awareness.    Patient does not have any follow up appointment scheduled with the clinic at this time.    A letter will be mailed to the address on file. Following that letter, no additional outreach attempts will be made unless this provider is contacted by another referring provider.     Natalie Mejia Psy.D.  they/them/theirs  Primary Care Behavioral Health Fellow

## 2022-05-15 ENCOUNTER — HEALTH MAINTENANCE LETTER (OUTPATIENT)
Age: 27
End: 2022-05-15

## 2022-07-12 DIAGNOSIS — F64.0 GENDER DYSPHORIA IN ADOLESCENT AND ADULT: ICD-10-CM

## 2022-07-12 RX ORDER — TESTOSTERONE 1.62 MG/G
3 GEL TRANSDERMAL DAILY
Qty: 75 G | Refills: 0 | Status: SHIPPED | OUTPATIENT
Start: 2022-07-12 | End: 2022-11-28

## 2022-07-12 NOTE — TELEPHONE ENCOUNTER
Limited Refill  Last seen for clinic visit in Jan 2022. At that time, plan for follow-up in April 2022. Overdue for visit.   Filled one time script of testosterone gel and routed to FD to call patient to schedule follow-up visit with Dr. Brand or [place] within one month.     Hanna

## 2022-09-11 ENCOUNTER — HEALTH MAINTENANCE LETTER (OUTPATIENT)
Age: 27
End: 2022-09-11

## 2022-11-28 ENCOUNTER — MYC REFILL (OUTPATIENT)
Dept: FAMILY MEDICINE | Facility: CLINIC | Age: 27
End: 2022-11-28

## 2022-11-28 DIAGNOSIS — F64.0 GENDER DYSPHORIA IN ADOLESCENT AND ADULT: ICD-10-CM

## 2022-11-28 DIAGNOSIS — F64.0 GENDER DYSPHORIA IN ADULT: ICD-10-CM

## 2022-11-28 DIAGNOSIS — F64.9 GENDER DYSPHORIA: Primary | ICD-10-CM

## 2022-11-29 RX ORDER — TESTOSTERONE 1.62 MG/G
60.75 GEL TRANSDERMAL DAILY
Qty: 75 G | Refills: 0 | Status: SHIPPED | OUTPATIENT
Start: 2022-11-29 | End: 2024-09-11

## 2022-11-29 NOTE — TELEPHONE ENCOUNTER
Limited Refill    Please see my note from 7/12/22.   Refilled androgel now.   Routing note to  and to patient via MyChart that patient must make follow-up appointment prior to any further refills.     Hanna Brand MD

## 2023-01-05 ENCOUNTER — TELEPHONE (OUTPATIENT)
Dept: FAMILY MEDICINE | Facility: CLINIC | Age: 28
End: 2023-01-05

## 2023-01-05 NOTE — TELEPHONE ENCOUNTER
Reason for call: Schedule appointment     Attempt to reach: 1st    Outcome:Left voicemail    Detailed message left? Yes    Please return call to [facility]     Clinic phone number [phone]

## 2023-06-03 ENCOUNTER — HEALTH MAINTENANCE LETTER (OUTPATIENT)
Age: 28
End: 2023-06-03

## 2024-07-13 ENCOUNTER — HEALTH MAINTENANCE LETTER (OUTPATIENT)
Age: 29
End: 2024-07-13

## 2024-09-11 ENCOUNTER — OFFICE VISIT (OUTPATIENT)
Dept: FAMILY MEDICINE | Facility: CLINIC | Age: 29
End: 2024-09-11
Payer: COMMERCIAL

## 2024-09-11 VITALS
DIASTOLIC BLOOD PRESSURE: 60 MMHG | OXYGEN SATURATION: 98 % | HEART RATE: 78 BPM | WEIGHT: 112 LBS | SYSTOLIC BLOOD PRESSURE: 98 MMHG | TEMPERATURE: 98.3 F | BODY MASS INDEX: 18 KG/M2 | HEIGHT: 66 IN | RESPIRATION RATE: 20 BRPM

## 2024-09-11 DIAGNOSIS — J31.0 CHRONIC RHINITIS: Chronic | ICD-10-CM

## 2024-09-11 DIAGNOSIS — F41.1 GENERALIZED ANXIETY DISORDER: Chronic | ICD-10-CM

## 2024-09-11 DIAGNOSIS — F64.0 GENDER DYSPHORIA IN ADULT: Primary | ICD-10-CM

## 2024-09-11 DIAGNOSIS — F33.1 MAJOR DEPRESSIVE DISORDER, RECURRENT EPISODE, MODERATE (H): Chronic | ICD-10-CM

## 2024-09-11 PROCEDURE — 99214 OFFICE O/P EST MOD 30 MIN: CPT

## 2024-09-11 RX ORDER — FEXOFENADINE HCL 180 MG/1
180 TABLET ORAL DAILY
COMMUNITY

## 2024-09-11 RX ORDER — BUSPIRONE HYDROCHLORIDE 10 MG/1
10 TABLET ORAL DAILY
Qty: 90 TABLET | Refills: 1 | Status: SHIPPED | OUTPATIENT
Start: 2024-09-11 | End: 2024-10-07

## 2024-09-11 ASSESSMENT — ANXIETY QUESTIONNAIRES
8. IF YOU CHECKED OFF ANY PROBLEMS, HOW DIFFICULT HAVE THESE MADE IT FOR YOU TO DO YOUR WORK, TAKE CARE OF THINGS AT HOME, OR GET ALONG WITH OTHER PEOPLE?: SOMEWHAT DIFFICULT
GAD7 TOTAL SCORE: 9
7. FEELING AFRAID AS IF SOMETHING AWFUL MIGHT HAPPEN: NOT AT ALL
GAD7 TOTAL SCORE: 9
GAD7 TOTAL SCORE: 9

## 2024-09-11 ASSESSMENT — PATIENT HEALTH QUESTIONNAIRE - PHQ9
SUM OF ALL RESPONSES TO PHQ QUESTIONS 1-9: 18
10. IF YOU CHECKED OFF ANY PROBLEMS, HOW DIFFICULT HAVE THESE PROBLEMS MADE IT FOR YOU TO DO YOUR WORK, TAKE CARE OF THINGS AT HOME, OR GET ALONG WITH OTHER PEOPLE: VERY DIFFICULT
SUM OF ALL RESPONSES TO PHQ QUESTIONS 1-9: 18

## 2024-09-11 ASSESSMENT — COLUMBIA-SUICIDE SEVERITY RATING SCALE - C-SSRS
2. IN THE PAST MONTH, HAVE YOU ACTUALLY HAD ANY THOUGHTS OF KILLING YOURSELF?: NO
1. WITHIN THE PAST MONTH, HAVE YOU WISHED YOU WERE DEAD OR WISHED YOU COULD GO TO SLEEP AND NOT WAKE UP?: NO
6. HAVE YOU EVER DONE ANYTHING, STARTED TO DO ANYTHING, OR PREPARED TO DO ANYTHING TO END YOUR LIFE?: NO

## 2024-09-11 NOTE — PROGRESS NOTES
Assessment & Plan     (F64.0) Gender dysphoria in adult  (primary encounter diagnosis)  Comment: Chronic and unmanaged.  History of gender dysphoria with previous use of testosterone that was most recently prescribed in 2022.  Reestablished insurance coverage, and is very interesting in reestablishing hormone replacement therapy.  Will refer for comprehensive gender care today as well as an adult mental health referral, as he is also describing that his mental health and quality of life have been quite affected by worsening gender dysphoria since he came off of T he can continue with more comprehensive care through the general for wound care clinic, or if he also needs to reestablish care with a primary care provider, I would recommend a.  Educated the him that Provider at our clinic who specializes in gender affirming care and primary care.  Patient is feeling very optimistic about this plan of care, and attest understanding and agreement to plan  Plan: Comprehensive Gender Care Referral, Adult         Mental Health  Referral    (F33.1) Major depressive disorder, recurrent episode, moderate (H)  (F41.1) Generalized anxiety disorder  Comment: Chronic and acutely worsened.  Patient was previously on Zoloft and BuSpar, but had been stopped being prescribed these medications due to insurance coverage.  He feels that his quality of life and mental health have been quite affected, but he declines any suicidal ideation.  He does engage in self-injurious behavior including biting himself when he is more heightened with his mental health concerns.  No current concern for acute mental health crisis, so no need for immediate medical attention today, but do need to refer him to mental health resources, as he is very open to beginning therapy.  Not interested in restarting an SSRI, as he felt Zoloft was not helpful for him at all.  Very interested in restarting BuSpar, as he felt this was very beneficial for his  anxiety, which also helped to manage his depression and turned.  Will restart BuSpar at previous dosing, and did encourage him to follow-up to establish care with a new PCP. Offered education on medications including appropriate dosing, possible side effects, and possible adverse effects.  Education given on return to clinic instructions as well as alarm signs that would require the need for immediate medical attention.  Patient attested to understanding.  Plan: busPIRone (BUSPAR) 10 MG tablet, Adult Mental         Health  Referral    (J31.0) Chronic rhinitis  Comment: Chronic.  Patient is currently taking daily Allegra, and was previously taking azelastine, which he stopped.  He does not feel that the Allegra is fully managing his chronic rhinitis concerns, and would like a referral to an allergy specialist to discuss other options such as allergy shots.  Will place this referral today.  Plan: Adult Allergy/Asthma  Referral      This progress note has been dictated, with use of voice recognition software. Any grammatical, typographical, or context errors are unintentional and inherent to use of voice recognition software.     Ordering of each unique test  Prescription drug management  I spent a total of 26 minutes on the day of the visit.   Time spent by me doing chart review, history and exam, documentation and further activities per the note    Depression Screening Follow Up        9/11/2024    12:10 PM   PHQ   PHQ-9 Total Score 18   Q9: Thoughts of better off dead/self-harm past 2 weeks Several days   F/U: Thoughts of suicide or self-harm Yes   F/U: Self harm-plan No   F/U: Self-harm action Yes   F/U: Safety concerns No         9/11/2024    12:10 PM   Last PHQ-9   1.  Little interest or pleasure in doing things 3   2.  Feeling down, depressed, or hopeless 1   3.  Trouble falling or staying asleep, or sleeping too much 3   4.  Feeling tired or having little energy 3   5.  Poor appetite or  overeating 3   6.  Feeling bad about yourself 1   7.  Trouble concentrating 3   8.  Moving slowly or restless 0   Q9: Thoughts of better off dead/self-harm past 2 weeks 1   PHQ-9 Total Score 18   In the past two weeks have you had thoughts of suicide or self harm? Yes   Do you have concerns about your personal safety or the safety of others? No   In the past 2 weeks have you thought about a plan or had intention to harm yourself? No   In the past 2 weeks have you acted on these thoughts in any way? Yes               9/11/2024     1:16 PM   C-SSRS (Brief [place])   Within the last month, have you wished you were dead or wished you could go to sleep and not wake up? No   Within the last month, have you had any actual thoughts of killing yourself? No   Within the last month, have you ever done anything, started to do anything, or prepared to do anything to end your life? No             Follow Up Actions Taken  Crisis resource information provided in the After Visit Summary  Mental Health Referral placed    Discussed the following ways the patient can remain in a safe environment:  be around others    FUTURE APPOINTMENTS:       - Make appointment with allergy, gender affirming care, and mental health specialist       - Follow-up for annual visit or as needed  Patient Instructions   I am glad that you came in today.  Based on our discussion, I think adding on some medication to manage her anxiety and depression is a good neck step.  We will begin by using BuSpar 10 mg twice daily to manage your anxiety. It sounds like BuSpar has worked well for you in the past.     I have also placed a referral to mental health services, as I think getting established with a therapist will also be helpful    Some common side effects of these medications include minor GI upset such as nausea or diarrhea, dull headache, and changes in libido.  Many of the side effects oftentimes go away after a number of weeks to months of being on the  medication.  If you feel that these side effects are tolerable to you while your body gets used to the medication, I would encourage you to continue on with that.  If you feel that the side effects are something that you are unable to tolerate, I would like you to reach out to me to let me, as we can discuss taking a different medication, or taking you off of the medication.    I would like you to follow-up to establish care with a new primary care provider in 2 weeks.  This will give you the opportunity to discuss how your mental health is doing, and to discuss any possible side effects that the medications are causing you.  Though this may not be the best time to discuss adjustment in your medication dosing, close follow-up will ensure that we are keeping a close eye on how your mental health is doing.  These medications may not take full effect until about 6 to 8 weeks of being on, but you will likely start seeing improvement in your mental health after about 1 to 2 weeks.    These medications can sometimes cause adverse side effects of mental health including suicidal ideation, or worsened anxiety and depression.  If this is the case for you, please reach out immediately for medical attention.    Seek immediate medical attention with symptoms including chest pain, shortness of breath, nausea and vomiting that prevents you from keeping food down, thunderclap headache, blurry or double vision, changes in hearing, confusion, or acute changes in mental status    I am placing a referral to the [facility] clinic today so that we can get you established with hormone therapy.     If you are interested in establishing with a new PCP I would encourage you to stop by the  on your way out, or call the scheduling line to do so    It is important to seek immediate medical attention if you are having symptoms of chest pain, fever, shortness of breath, palpitations, or any changes in your mental  status.        Subjective   Jerson is a 29 year old, presenting for the following health issues:  Consult (Gender affirming care. Pt has had body dysmorphia since childhood. Prefers pronouns he/him. Currently not on any medication due to uninsured. It has been about 2 years since any meds have been on board. Does have high levels of social anxiety. Would like to get back on meds. )        9/11/2024    12:45 PM   Additional Questions   Roomed by Yolanda Andrea   Accompanied by none     History of Present Illness       Reason for visit:  Continuing care for gender dysphoria    He eats 2-3 servings of fruits and vegetables daily.He consumes 0 sweetened beverage(s) daily.He exercises with enough effort to increase his heart rate 9 or less minutes per day.  He exercises with enough effort to increase his heart rate 3 or less days per week. He is missing 7 dose(s) of medications per week.  He is not taking prescribed medications regularly due to other.         Jerson is a 29-year-old patient with a past medical history significant for migraine headaches, chronic rhinitis, vitamin D deficiency, dyslipidemia, anxiety, depression, ADHD, and gender dysphoria who presents today with desire to discuss gender affirming care.  Patient has a longstanding history of gender dysphoria since about age 12.  He has previously been on testosterone therapy, and most recently stopped this therapy in 2022 due to insurance and cost concerns.  Patient reports that this has been very difficult to manage, and has had significant impact on quality life and mental health.  He had previously been managing his mental health with the use of Zoloft and BuSpar, but also had to come off of these medications a couple of years ago due to insurance and cost coverage.  Since this time, patient has been greatly struggling with his mental health and gender dysphoria.  He has a hard time gaining weight, but has a hard time establishing a set exercise plan to  "build muscle mass, as he has a significant amount of social anxiety, and anxiety surrounding his appearance.  He declines any suicidal ideation, but does cope with mental health via self-injurious behavior such as biting himself.  He has no suicidal concerns, but has been biting himself more often recently, and in the past this is occasionally caused skin infections and abrasion.  He has no current concerns for open sores, lesions, or cellulitis.  Patient also has a history of allergies to dust mites.  He is currently taking Allegra daily, and was previously taking azelastine.  He did not feel that the azelastine was helpful at all, and feels that the Allegra is only mildly helpful.  He was told in the past that he may benefit from allergy shots, and is requesting a referral to an allergy specialist.  Outside of this he feels quite well, and declines any chest pain, shortness of breath, lightheadedness or dizziness, blurry or double vision, fever, chills, body aches, abdominal pain, nausea, vomiting, fatigue, or unintentional and abnormal weight loss.        1/21/2022     9:21 AM 3/1/2022     9:44 AM 9/11/2024    12:10 PM   PHQ   PHQ-9 Total Score 6 7 18   Q9: Thoughts of better off dead/self-harm past 2 weeks Not at all Not at all Several days   F/U: Thoughts of suicide or self-harm   Yes   F/U: Self harm-plan   No   F/U: Self-harm action   Yes   F/U: Safety concerns   No           Review of Systems  Constitutional, HEENT, cardiovascular, pulmonary, gi and gu systems are negative, except as otherwise noted.      Objective    BP 98/60 (BP Location: Left arm, Patient Position: Sitting, Cuff Size: Adult Regular)   Pulse 78   Temp 98.3  F (36.8  C) (Oral)   Resp 20   Ht 1.676 m (5' 6\")   Wt 50.8 kg (112 lb)   LMP 08/11/2024 (Approximate)   SpO2 98%   BMI 18.08 kg/m    Body mass index is 18.08 kg/m .  Physical Exam   GENERAL: alert and no distress  EYES: Eyes grossly normal to inspection, PERRL and conjunctivae " and sclerae normal  HENT: ear canals and TM's normal, nose and mouth without ulcers or lesions  NECK: no adenopathy, no asymmetry, masses, or scars  RESP: lungs clear to auscultation - no rales, rhonchi or wheezes  CV: regular rate and rhythm, normal S1 S2, no S3 or S4, no murmur, click or rub, no peripheral edema  ABDOMEN: soft, nontender, no hepatosplenomegaly, no masses and bowel sounds normal  MS: no gross musculoskeletal defects noted, no edema  NEURO: Normal strength and tone, mentation intact and speech normal  PSYCH: mentation appears normal, anxious    Sofia Meyers DNP FNP-C  Family Nurse Practitioner - Same Day Provider  [facility] [place]        Signed Electronically by: CONSUELO Restrepo CNP

## 2024-09-11 NOTE — PATIENT INSTRUCTIONS
I am glad that you came in today.  Based on our discussion, I think adding on some medication to manage her anxiety and depression is a good neck step.  We will begin by using BuSpar 10 mg twice daily to manage your anxiety. It sounds like BuSpar has worked well for you in the past.     I have also placed a referral to mental health services, as I think getting established with a therapist will also be helpful    Some common side effects of these medications include minor GI upset such as nausea or diarrhea, dull headache, and changes in libido.  Many of the side effects oftentimes go away after a number of weeks to months of being on the medication.  If you feel that these side effects are tolerable to you while your body gets used to the medication, I would encourage you to continue on with that.  If you feel that the side effects are something that you are unable to tolerate, I would like you to reach out to me to let me, as we can discuss taking a different medication, or taking you off of the medication.    I would like you to follow-up to establish care with a new primary care provider in 2 weeks.  This will give you the opportunity to discuss how your mental health is doing, and to discuss any possible side effects that the medications are causing you.  Though this may not be the best time to discuss adjustment in your medication dosing, close follow-up will ensure that we are keeping a close eye on how your mental health is doing.  These medications may not take full effect until about 6 to 8 weeks of being on, but you will likely start seeing improvement in your mental health after about 1 to 2 weeks.    These medications can sometimes cause adverse side effects of mental health including suicidal ideation, or worsened anxiety and depression.  If this is the case for you, please reach out immediately for medical attention.    Seek immediate medical attention with symptoms including chest pain, shortness of  breath, nausea and vomiting that prevents you from keeping food down, thunderclap headache, blurry or double vision, changes in hearing, confusion, or acute changes in mental status    I am placing a referral to the Gender Affirming Care clinic today so that we can get you established with hormone therapy.     If you are interested in establishing with a new PCP I would encourage you to stop by the  on your way out, or call the scheduling line to do so    It is important to seek immediate medical attention if you are having symptoms of chest pain, fever, shortness of breath, palpitations, or any changes in your mental status.

## 2024-09-16 ENCOUNTER — TELEPHONE (OUTPATIENT)
Dept: PLASTIC SURGERY | Facility: CLINIC | Age: 29
End: 2024-09-16
Payer: COMMERCIAL

## 2024-09-16 NOTE — TELEPHONE ENCOUNTER
Writer called patient to discuss recent message sent to a team member. Patient was emotional and expressed frustrations with the time it is taking for him to get top surgery. Writer discussed the challenges and barriers and discussed a plan to get back on track (See below)      Tips to getting back on track    Write down all of the things you need to do to get back on track with surgery. Once you have them all down, organize them into order as best you can.    To move get back on track you will:    Finding new providers  Schedule a consultation with a surgeon so you have a date and something to work towards  Find a new therapist using the referral list. You can see if a friend can help support you with calls or be present with you. If there is an email address, try to send emails to providers asking about their availability. Draft a general email you can copy/paste to make it easier.    Re-start your testosterone  Find a new provider in referral list or re-establish care with previous one.  Use alternative rituals to  do prior to testosterone tx during times taking a shower  is challenging for you. This is to help you keep up with your treatments. For example, using wipes or towels to clean the areas where gel should go. You may also discuss other HRT methods that might work better for you.    Speak to PCP about issues with your sleep  Send a [facility] message to request an appointment. This can be a telehealth visit if you prefer to minimize interaction.    Find ways to make calling to set up appointments more accessible for you.  See if a friend or family member can support you while you make the call.  Communicate and schedule appointments with your providers through [facility].    Write out a script for yourself that includes what you are calling for, which provider you'd like to see, and your medical information. You might also want to have your calendar handy so you know your availability.    Finding ways to cope  and comfort yourself when feeling overwhelmed or in distress.  Take a bath and pet your cat  Call a friend or family member  Call a peer or mental health support line.

## 2024-10-04 ENCOUNTER — TELEPHONE (OUTPATIENT)
Dept: PLASTIC SURGERY | Facility: CLINIC | Age: 29
End: 2024-10-04
Payer: COMMERCIAL

## 2024-10-07 ENCOUNTER — OFFICE VISIT (OUTPATIENT)
Dept: FAMILY MEDICINE | Facility: CLINIC | Age: 29
End: 2024-10-07
Payer: COMMERCIAL

## 2024-10-07 VITALS
SYSTOLIC BLOOD PRESSURE: 94 MMHG | HEIGHT: 66 IN | TEMPERATURE: 98.9 F | OXYGEN SATURATION: 99 % | DIASTOLIC BLOOD PRESSURE: 68 MMHG | RESPIRATION RATE: 14 BRPM | WEIGHT: 110.9 LBS | BODY MASS INDEX: 17.82 KG/M2 | HEART RATE: 73 BPM

## 2024-10-07 DIAGNOSIS — F41.1 GENERALIZED ANXIETY DISORDER: ICD-10-CM

## 2024-10-07 DIAGNOSIS — F33.1 MAJOR DEPRESSIVE DISORDER, RECURRENT EPISODE, MODERATE (H): ICD-10-CM

## 2024-10-07 DIAGNOSIS — F90.1 ATTENTION-DEFICIT HYPERACTIVITY DISORDER, PREDOMINANTLY HYPERACTIVE TYPE: ICD-10-CM

## 2024-10-07 DIAGNOSIS — Z00.00 ROUTINE GENERAL MEDICAL EXAMINATION AT A HEALTH CARE FACILITY: Primary | ICD-10-CM

## 2024-10-07 DIAGNOSIS — F64.0 GENDER DYSPHORIA IN ADULT: ICD-10-CM

## 2024-10-07 LAB
BASOPHILS # BLD AUTO: 0 10E3/UL (ref 0–0.2)
BASOPHILS NFR BLD AUTO: 0 %
EOSINOPHIL # BLD AUTO: 0.1 10E3/UL (ref 0–0.7)
EOSINOPHIL NFR BLD AUTO: 2 %
ERYTHROCYTE [DISTWIDTH] IN BLOOD BY AUTOMATED COUNT: 12.3 % (ref 10–15)
HCT VFR BLD AUTO: 36.1 % (ref 35–53)
HGB BLD-MCNC: 12.5 G/DL (ref 11.7–17.7)
IMM GRANULOCYTES # BLD: 0 10E3/UL
IMM GRANULOCYTES NFR BLD: 0 %
LYMPHOCYTES # BLD AUTO: 1.3 10E3/UL (ref 0.8–5.3)
LYMPHOCYTES NFR BLD AUTO: 28 %
MCH RBC QN AUTO: 31.4 PG (ref 26.5–33)
MCHC RBC AUTO-ENTMCNC: 34.6 G/DL (ref 31.5–36.5)
MCV RBC AUTO: 91 FL (ref 78–100)
MONOCYTES # BLD AUTO: 0.2 10E3/UL (ref 0–1.3)
MONOCYTES NFR BLD AUTO: 5 %
NEUTROPHILS # BLD AUTO: 2.9 10E3/UL (ref 1.6–8.3)
NEUTROPHILS NFR BLD AUTO: 64 %
PLATELET # BLD AUTO: 275 10E3/UL (ref 150–450)
RBC # BLD AUTO: 3.98 10E6/UL (ref 3.8–5.9)
WBC # BLD AUTO: 4.6 10E3/UL (ref 4–11)

## 2024-10-07 PROCEDURE — 86706 HEP B SURFACE ANTIBODY: CPT

## 2024-10-07 PROCEDURE — 80061 LIPID PANEL: CPT

## 2024-10-07 PROCEDURE — 99395 PREV VISIT EST AGE 18-39: CPT

## 2024-10-07 PROCEDURE — 84403 ASSAY OF TOTAL TESTOSTERONE: CPT

## 2024-10-07 PROCEDURE — 86803 HEPATITIS C AB TEST: CPT

## 2024-10-07 PROCEDURE — 99214 OFFICE O/P EST MOD 30 MIN: CPT | Mod: 25

## 2024-10-07 PROCEDURE — 36415 COLL VENOUS BLD VENIPUNCTURE: CPT

## 2024-10-07 PROCEDURE — 85025 COMPLETE CBC W/AUTO DIFF WBC: CPT

## 2024-10-07 PROCEDURE — 82947 ASSAY GLUCOSE BLOOD QUANT: CPT

## 2024-10-07 PROCEDURE — 86708 HEPATITIS A ANTIBODY: CPT

## 2024-10-07 PROCEDURE — 80076 HEPATIC FUNCTION PANEL: CPT

## 2024-10-07 PROCEDURE — 96127 BRIEF EMOTIONAL/BEHAV ASSMT: CPT

## 2024-10-07 RX ORDER — FAMOTIDINE 20 MG
3000 TABLET ORAL DAILY
COMMUNITY

## 2024-10-07 RX ORDER — ESCITALOPRAM OXALATE 5 MG/1
5 TABLET ORAL DAILY
Qty: 90 TABLET | Refills: 3 | Status: CANCELLED | OUTPATIENT
Start: 2024-10-07 | End: 2025-10-02

## 2024-10-07 RX ORDER — TESTOSTERONE 1.62 MG/G
2 GEL TRANSDERMAL DAILY
Qty: 75 G | Refills: 0 | Status: SHIPPED | OUTPATIENT
Start: 2024-10-07 | End: 2024-10-07

## 2024-10-07 RX ORDER — TESTOSTERONE 1.62 MG/G
2 GEL TRANSDERMAL DAILY
Qty: 75 G | Refills: 0 | Status: SHIPPED | OUTPATIENT
Start: 2024-10-07 | End: 2024-11-10

## 2024-10-07 RX ORDER — DEXTROAMPHETAMINE SACCHARATE, AMPHETAMINE ASPARTATE, DEXTROAMPHETAMINE SULFATE AND AMPHETAMINE SULFATE 1.25; 1.25; 1.25; 1.25 MG/1; MG/1; MG/1; MG/1
5 TABLET ORAL DAILY
Qty: 30 TABLET | Refills: 0 | Status: SHIPPED | OUTPATIENT
Start: 2024-10-07 | End: 2024-11-10

## 2024-10-07 SDOH — HEALTH STABILITY: PHYSICAL HEALTH: ON AVERAGE, HOW MANY DAYS PER WEEK DO YOU ENGAGE IN MODERATE TO STRENUOUS EXERCISE (LIKE A BRISK WALK)?: 0 DAYS

## 2024-10-07 SDOH — HEALTH STABILITY: PHYSICAL HEALTH: ON AVERAGE, HOW MANY MINUTES DO YOU ENGAGE IN EXERCISE AT THIS LEVEL?: PATIENT DECLINED

## 2024-10-07 ASSESSMENT — SOCIAL DETERMINANTS OF HEALTH (SDOH): HOW OFTEN DO YOU GET TOGETHER WITH FRIENDS OR RELATIVES?: ONCE A WEEK

## 2024-10-07 NOTE — PROGRESS NOTES
Preventive Care Visit  [facility]  Alexis Helton DO, Family Medicine  Oct 7, 2024      Assessment & Plan     Routine general medical examination at a health care facility  No record of vaccinations likely due to the name change at the age of 15 or 16  Will check for antibodies of A, B, and C types of hepatitis  Will refrain from giving other vaccinations at this time  Patient does need a Pap smear but due to gender phoria struggles with having this follow-up done  Suggested to patient that we will do this in 3 months if they are ready but if not we can refrain at this time  Patient will likely need a mammogram before getting top surgery done  - Hepatitis C Screen Reflex to HCV RNA Quant and Genotype; Future  - Hepatitis B Surface Antibody; Future  - Hepatitis A Antibody Total; Future      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 -  October 7, 2024  Goals for care: Comfortable enough own body    Oily skin: would like facial  Increased sex drive: not a bad  Body hair growth: yes  Clitoromegaly:  that has already happened and can be okay  Cessation of menses: still having this monthly and woul dlike it to   Hair loss: n/a  Body fat redistributing: yes  Increased muscle mass: wants more increase of muscle mass  Vocal deepening: yes  Adverse reactions: no history of blood clots or blood disorders, family history clotting  Wants to have surgery    Continuation of Care  Discussed the importance of maintaining routine schedule -- Injection Day  Goals As Above  We are getting a baseline lab work today  We are going to put in a care coordination as the patient has severe social anxiety that makes it a struggle to call and make appointments    Check Hemoglobin and Testosterone every 3 Months  Notify Clinic of changes in mentation, bleeding, dizziness  Continue Care with other specialists   - Glucose; Future  - CBC with Diff Plt; Future  - Hepatic Panel; Future  - Testosterone total; Future  - Testosterone total;  "Future  - Hepatic Panel; Future  - CBC with Diff Plt; Future  - Lipid Cascade; Future  - Glucose; Future  - Primary Care - Care Coordination Referral; Future  - testosterone (ANDROGEL 1.62 % PUMP) 20.25 MG/ACT gel; Place 2 Pump (40.5 mg) onto the skin daily.      Major depressive disorder, recurrent episode, moderate (H)  Generalized anxiety disorder  Was on Zoloft in the past -   Has been on multiple selective serotonin reuptake inhibitor's in the past -   Buspar worked well for this patient for the anxiety  Tried Adderrall in the past and that worked well  Tried Wellbutrin in the past -- made \"uselessly drowsy\" and unable to drive  We will resume Adderall 5 mg in the morning and titrate up as appropriate  Patient was instructed to call us and let us know if she has increased suicidality ideation  We could also consider Lexapro  - Primary Care - Care Coordination Referral; Future    Attention-deficit hyperactivity disorder, predominantly hyperactive type  Believe that the patient has a component of mood disorder exacerbating their ADHD discussed that we could use to treat the mood symptoms or the ADHD  They believe that they would do better with treating the ADHD because and they be focused to get things done  We will start Adderall 5 mg in the morning and titrate up as needed  - PHQ-9  - amphetamine-dextroamphetamine (ADDERALL) 5 MG tablet; Take 1 tablet (5 mg) by mouth daily.  - Primary Care - Care Coordination Referral; Future    Patient has been advised of split billing requirements and indicates understanding: Yes        Counseling  Appropriate preventive services were addressed with this patient via screening, questionnaire, or discussion as appropriate for fall prevention, nutrition, physical activity, Tobacco-use cessation, social engagement, weight loss and cognition.  Checklist reviewing preventive services available has been given to the patient.  Reviewed patient's diet, addressing concerns and/or " questions.     [facility] Crisis Hotlines via phone or text - 988    [facility] offers various levels of support. Call them at: [phone]    [facility] has a mental health emergency department at the [facility] -that is open 24/7 -for active suicidal or homicidal ideation with plans.     Please call the following crisis line if you are experiencing a mental health emergency: National Suicide Prevention Hotline: 1-134.458.2779 or Crisis Connection 241-253-0985      Text HOME to 058907 to connect with a Crisis Counselor  Free 24/7 support through chat on suicidepreventionlifeline.org   For more information go to https://www.crisistextline.org/    [facility],   [address] [place], [place] [address]  [phone] ext [phone]  [phone] Fax    Psychiatry and Psychotherapy Services:  [facility] - Acute Psychiatric Services through [facility] [phone]  You can go to [facility] 24/7 to see a psychiatrist as they always have a psychiatrist available. The wait times are shorter in the morning as there are more staff members available. If you do not have insurance you can ask to see a financial counselor to help with benefits, etc.       Myth: Talking about suicide will lead to and encourage suicide.  Fact: There is a widespread stigma associated with suicide and as a result, many people are afraid to speak about it. Talking about suicide not only reduces the stigma, but also allows individuals to seek help, rethink their opinions and share their story with others. We all need to talk more about suicide.     Debunking these common myths about suicide can hopefully allow individuals to look at suicide from a different angle--one of understanding and compassion for an individual who is internally struggling. Maybe they are struggling with a mental illness or maybe they are under extreme pressure and do not have healthy coping skills or a strong support system.     As  "a society, we should not be afraid to speak up about suicide, to speak up about mental illness or to seek out treatment for an individual who is in need. Eliminating the stigma starts by understanding why suicide occurs and advocating for mental health awareness within our communities. There are suicide hotlines, mental health support groups, online community resources and many mental health professionals who can help any individual who is struggling with unhealthy thoughts and emotions.       Suicide prevention talk track:    1. Have your support person ask you a few times a week - about suicidal thoughts    If \"yes\"       Support person:    \"do you feel safe now?\" If \"no\" say \"I'm going to be close to you\" don't leave person alone - often times suicidal ideation is low moment and temporary and they need to be watched in a moment. - if persists to to ED.     \"Do you have a plan\" - remove all means to this plan (I.e knives, guns, medications, ect and keep them sofiya safe place) - If \"Yes\" - do to ED, if no, besure to reach out to medical provider/therapist.     \"Is there something in this house or your room that makes you feel unsafe?\" Remove this object from the house and stay with until feeling better, or if unable to do so - bring to ED.     2. Plan for a safe word to give your support person: \"I need a silva\"   This indicates that you're feeling VERY LOW and having active or persistent thoughts of wanting to be dead or SI     She is around you all the time - not saying or doing anything different or necessarily distracting, but just there.     -during this time she can ask if there anything she can do that would be more helpful. - if \"No\" - support person should honor this and just be around.     Second word: \"Thanks Silva\" - your communication to your support person that a danger time has passed and you are doing better and you can be left alone again.            Jennifer Arthur is a 29 year old, presenting " for the following:  Establish Care (Est care and referrals. Would like to get tested for ADHD. Med questions. )        10/7/2024     2:59 PM   Additional Questions   Roomed by lina cali        Health Care Directive  Patient does not have a Health Care Directive or Living Will: Discussed advance care planning with patient; however, patient declined at this time.    HPI    HPI:    PCP: Alexis Helton    Date started: found out around the age of 12; thought they would grow out of it. Grew up in the [facility]; first 10 years in [place]. Spent time in [place]; dad is still there. Mom is still here.   GI:Masculine  SO: attracted to men  Timeline: around the age of 14 or 15.   Affirming Steps: Legal Name change at 15 or 16. Early 20s first time Testosterone in the past - difficult with taking medicines at the same time time each day. Was working through a [employer]. Recently restarted  Mental Health: Long history of anxiety, depression, ADHD  Therapy: not going to therapy. Difficult making the appointments  Familial Support: mom has a tendency of hoarding and that complicated their relationship. Jerson currently lives with his mother. Has a sister in [place] - falling out in 2020 due to mom's hoarding. Father has narcissictic personality disorder  Significant Other: not at this time  Sexual Practices: n/a    Feels Safe at Home: no yelling or being hit.    Job: was interested in medical something. 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 Previously on Vyvanse that increased the irritability      Preventative:   PAP: never done  Mammo: never done    ROS:  GENERAL: No fever, chills, weight loss or gain  HEENT: No headache, trauma, changes in vision, hearing, nasal congestion, dental  pain, neck pain, sore throat.  CARDIAC: Denies chest pain or shortness of breath  LUNG: Denies dyspnea on exertion or chest pain  ABD: Denies pain, nausea, vomiting, diarrhea, constipation  SKIN: Denies new rashes  NEURO: No numbness, weakness, tingling   MSK: No weakness  PSYCH: Chronic anxiety and depression                       10/7/2024   General Health   How would you rate your overall physical health? Good   Feel stress (tense, anxious, or unable to sleep) To some extent      (!) STRESS CONCERN      10/7/2024   Nutrition   Three or more servings of calcium each day? (!) I DON'T KNOW   Diet: Carbohydrate counting   How many servings of fruit and vegetables per day? (!) I DON'T KNOW   How many sweetened beverages each day? 0-1            10/7/2024   Exercise   Days per week of moderate/strenous exercise 0 days   Average minutes spent exercising at this level Patient declined      (!) EXERCISE CONCERN      10/7/2024   Social Factors   Frequency of gathering with friends or relatives Once a week   Worry food won't last until get money to buy more No   Food not last or not have enough money for food? No   Do you have housing? (Housing is defined as stable permanent housing and does not include staying ouside in a car, in a tent, in an abandoned building, in an overnight shelter, or couch-surfing.) Yes   Are you worried about losing your housing? Yes   Lack of transportation? No   Unable to get utilities (heat,electricity)? No   Want help with housing or utility concern? No      (!) HOUSING CONCERN PRESENT      10/7/2024   Dental   Dentist two times every year? (!) DECLINE            10/7/2024   TB Screening   Were you born outside of the [place]? No            Today's PHQ-9 Score:       9/11/2024    12:10 PM   PHQ-9 SCORE   PHQ-9 Total Score MyChart 18 (Moderately severe depression)   PHQ-9 Total Score 18           10/7/2024   Substance Use   Alcohol more than 3/day or more than 7/wk No   Do you use any other  "substances recreationally? (!) CANNABIS PRODUCTS    (!) OTHER       Multiple values from one day are sorted in reverse-chronological order     Social History     Tobacco Use    Smoking status: Never     Passive exposure: Never    Smokeless tobacco: Never   Vaping Use    Vaping status: Never Used   Substance Use Topics    Alcohol use: Yes     Comment: social drinker, soju     Drug use: Yes     Types: Marijuana     Comment: never IVDU                    10/7/2024   One time HIV Screening   Previous HIV test? I don't know          10/7/2024   STI Screening   New sexual partner(s) since last STI/HIV test? No        History of abnormal Pap smear: Discussed pros and cons of Pap smear  Will visit at next visit             10/7/2024   Contraception/Family Planning   Questions about contraception or family planning (!) DECLINE           Reviewed and updated as needed this visit by Provider   Tobacco  Allergies  Meds  Problems  Med Hx  Surg Hx  Fam Hx                     Objective    Exam  BP 94/68   Pulse 73   Temp 98.9  F (37.2  C) (Oral)   Resp 14   Ht 1.683 m (5' 6.26\")   Wt 50.3 kg (110 lb 14.4 oz)   LMP 09/30/2024 (Approximate)   SpO2 99%   BMI 17.76 kg/m     Estimated body mass index is 17.76 kg/m  as calculated from the following:    Height as of this encounter: 1.683 m (5' 6.26\").    Weight as of this encounter: 50.3 kg (110 lb 14.4 oz).    Physical Exam  Vitals reviewed.   Constitutional:       Appearance: Normal appearance.   HENT:      Head: Normocephalic and atraumatic.      Right Ear: Tympanic membrane, ear canal and external ear normal.      Left Ear: Tympanic membrane, ear canal and external ear normal.      Nose: Nose normal.      Mouth/Throat:      Mouth: Mucous membranes are moist.   Eyes:      Extraocular Movements: Extraocular movements intact.      Pupils: Pupils are equal, round, and reactive to light.   Cardiovascular:      Rate and Rhythm: Normal rate and regular rhythm.      Heart " sounds: Normal heart sounds.   Pulmonary:      Effort: Pulmonary effort is normal.      Breath sounds: Normal breath sounds.   Abdominal:      General: Abdomen is flat. Bowel sounds are normal.      Palpations: Abdomen is soft.   Musculoskeletal:      Cervical back: Normal range of motion and neck supple.   Skin:     General: Skin is warm and dry.   Neurological:      General: No focal deficit present.      Mental Status: He is alert.   Psychiatric:         Mood and Affect: Mood normal.         Behavior: Behavior normal.               Signed Electronically by: Alexis Helton DO

## 2024-10-07 NOTE — PATIENT INSTRUCTIONS
Name__________________   and     MRN___________________   or   Label Here        Informed Consent   Testosterone Therapy       This form refers to the use of testosterone by persons in the female-to-male spectrum who wish to become more masculine to reduce gender dysphoria and facilitate a more masculine gender presentation. While there are risks associated with taking testosterone, when appropriately prescribed it can greatly improve mental health and quality of life.     Please seek another opinion if you want additional perspective on any aspect of your care.       Masculinizing Effects     1. I understand that testosterone may be prescribed to reduce female physical characteristics and masculinize my body.     2. I understand that the masculinizing effects of testosterone can take several months to a year to become noticeable, that the rate and degree of change can't be predicted, and that changes may not be complete for 2-5 years after I start testosterone.     3. I understand that these changes will likely be permanent even if I stop taking testosterone:    Lower voice pitch (i.e., voice becoming deeper).   Increased growth of hair, with thicker/coarser hairs, on arms, legs, chest, back, and abdomen.   Gradual growth of moustache/facial hair.   Hair loss at the temples and crown of the head, with the possibility of becoming completely bald.   Genital changes may or may not be permanent if testosterone is stopped. These include clitoral growth (typically 1-3 cm) and vaginal dryness.     4. I understand that the following changes are usually not permanent (that is, they will likely reverse if I stop taking testosterone). Although testosterone does not change these features, there are other treatments that may be helpful:    Acne, which may be severe and can cause permanent scarring if not treated.   Fat may redistribute to a more masculine pattern (decreased on buttocks/hips/thighs, increased in abdomen -  "changing from \"pear shape\" to \"apple shape\").   Increased muscle mass and upper body strength.   Increased libido (sex drive).   Menstrual periods typically stop within 3-6 months of starting testosterone.   5. I understand that it is not known what the effects of testosterone are on fertility. Fertility is reduced and I may never be able to get pregnant, even if I stop testosterone. On the other hand, even if my period stops, it may still be possible for me to get pregnant, and I am aware of birth control options (if applicable). I have been informed that I can't take testosterone if I am pregnant.     6. I understand that there are some aspects of my body that will not be changed by testosterone:   Breasts may appear slightly smaller due to fat loss, but will not substantially shrink   Although voice pitch will likely drop, other aspects of speech will not become more masculine.       Risks of Testosterone     7. I understand that the medical effects and safety of testosterone are not fully understood, and that there may be long-term risks that are not yet known.     8. I understand that I am strongly advised not to take more testosterone than I am prescribed, as this increases health risks. I have been informed that taking more will not make masculinization happen more quickly or increase the degree of change.     9. I understand that testosterone can cause changes that increase my risk of heart disease, including:     Decreasing good cholesterol (HDL) and increasing bad cholesterol (LDL)   Increasing blood pressure   Increasing deposits of fat around my internal organ    I have been advised that my risks of heart disease are greater if people in my family have had heart disease, if I am overweight, or if I smoke.   I have been advised that heart health checkups, including monitoring of my weight and cholesterol levels, should be done periodically as long as I am taking testosterone.     10. I understand that " testosterone can damage the liver, possibly leading to liver disease. I have been advised that I should be monitored for as long as I am taking testosterone.     11. I understand that testosterone can increase the red blood cells and hemoglobin, and while the increase is usually only to a normal male range (which does not pose health risks), a high increase can cause potentially life-threatening problems such as stroke and heart attack. I have been advised that my blood should be monitored periodically while I am taking testosterone.     12. I understand that taking testosterone can increase my risk for diabetes by decreasing my body's response to insulin, causing weight gain, and increasing deposits of fat around my internal organs. I have been advised that my fasting blood glucose should be monitored periodically while I am taking testosterone.       13. I understand that it is not known if testosterone increases the risk of ovarian, breast, or uterine cancer.     14. I understand that taking testosterone can lead to my cervix and the walls of my vagina becoming more fragile, and that this can lead to tears or abrasions that increase the risk of sexually transmitted infections (including HIV) if I have vaginal sex - no matter what the gender of my partner is. I have been advised that roxana discussion with my doctor about my sexual practices can help determine how best to prevent and monitor for sexually transmitted infections.     15. I have been informed that testosterone can cause headaches or migraines. I understand that if I am frequently having headaches or migraines, or the pain is unusually severe, it is recommended that I talk with my health care provider.     16. I understand that testosterone can cause emotional changes, including increased irritability, frustration, and anger. I have been advised that my doctor can assist me in finding resources to explore and cope with these changes.     17. I  understand that testosterone will result in changes that will be noticeable by other people, and that some transgender people in similar circumstances have experienced harassment, discrimination, and violence, while others have lost support of loved ones. I have been advised that my doctor can assist me in finding advocacy and support resources.     Prevention of Medical Complications       18. I agree to take testosterone as prescribed and to tell my doctor if I am not happy with the treatment or am experiencing any problems.     19. I understand that the right dose or type of medication prescribed for me may not be the same as for someone else.     20. I understand that physical examinations and blood tests are needed on a regular basis to check for negative side effects of testosterone.     21. I understand that testosterone can interact with other medication (including other sources of hormones), dietary supplements, herbs, alcohol, and street drugs. I understand that being honest with my doctor about what else I am taking will help prevent medical complications that could be life-threatening. I have been informed that I will continue to get medical care no matter what information I share.     22. I understand that some medical conditions make it dangerous to take testosterone. I agree that I will be checked for risky conditions before the decision to take testosterone is made.     23. I understand that I can choose to stop taking testosterone at any time, and that it is advised that I do this with the help of my doctor to make sure there are no negative reactions to stopping. I understand that my doctor may suggest I reduce or stop taking testosterone if there are severe side effects or health risks that can't be controlled.         My signature below confirms that:       My doctor has talked with me about the benefits and risks of testosterone, the possible or likely consequences of hormone therapy, and  potential alternative treatment options.   I understand the risks that may be involved.   I understand that this form covers known effects and risks and that there may be long-term effects or risks that are not yet known  I have had sufficient opportunity to discuss treatment options with my doctor. All of my questions have been answered to my satisfaction.   I believe I have adequate knowledge on which to base informed consent to the provision of testosterone therapy.     Based on this:   _____ I wish to begin taking testosterone.     _____ I do not wish to begin taking testosterone at this time.         Whatever your current decision is, please talk with your doctor any time you have questions, concerns, or want to re-evaluate your options.         ____________________________________ __________________   Patient Signature     Date           ____________________________________ __________________   Prescribing clinician Signature    Date         Effects and Expected Time Course of Feminizing Hormones    Effect Expected Onset Expected Maximum Effect   Body Fat redistribution 3-6 months 2-5 years   Decreased Muscle mass 3-6 months 1-2 years   Softening of skin/decreased oiliness 3-6 months Unknown   Decreased Libido 1-3 months 1-2 years   Decreased Spontaneous Erections 1-3 months 3-6 months   Male Sexual Dysfunction Variable Variable   Breast Growth 3-6 months 2-3 years   Decreased Testicular volume 3-6 months 2-3 years   Decreased sperm production Variable Variable   Thinning and slowed growth of body and facial hair  + 6-12 months >3 years   Male pattern baldness No regrowth, loss stops 1-3 months 1-2 years   +complete removal of male facial hair and body hair requires electrolysis, laser treatment or both      Effects and Expected Time Course of Masculinizing  Hormones    Effect Expected Onset Expected Maximum Effect   Skin oiliness/Acne 1-6 months 1-2 years   Facial/body hair growth 3-6 months 3-5 years     Scalp hair loss >12 months+ Variable   Increased muscle mass/strength 6-12 months 2-5 years   Body fat redistribution 3-6 months 2-5 years   Cessation of menses 2-6 months n/a   Clitoral enlargement 3-6 months 1-2 years   Vaginal atrophy 3-6 months 1-2 years   Deepened voice 3-12 months 1-2 years

## 2024-10-08 LAB
ALBUMIN SERPL BCG-MCNC: 4.6 G/DL (ref 3.5–5.2)
ALP SERPL-CCNC: 50 U/L (ref 40–150)
ALT SERPL W P-5'-P-CCNC: 8 U/L (ref 0–70)
AST SERPL W P-5'-P-CCNC: 13 U/L (ref 0–45)
BILIRUB DIRECT SERPL-MCNC: <0.2 MG/DL (ref 0–0.3)
BILIRUB SERPL-MCNC: 0.6 MG/DL
CHOLEST SERPL-MCNC: 167 MG/DL
FASTING STATUS PATIENT QL REPORTED: NORMAL
FASTING STATUS PATIENT QL REPORTED: NORMAL
GLUCOSE SERPL-MCNC: 90 MG/DL (ref 70–99)
HAV AB SER QL IA: NONREACTIVE
HBV SURFACE AB SERPL IA-ACNC: <3.5 M[IU]/ML
HBV SURFACE AB SERPL IA-ACNC: NONREACTIVE M[IU]/ML
HCV AB SERPL QL IA: NONREACTIVE
HDLC SERPL-MCNC: 60 MG/DL
LDLC SERPL CALC-MCNC: 91 MG/DL
NONHDLC SERPL-MCNC: 107 MG/DL
PROT SERPL-MCNC: 7 G/DL (ref 6.4–8.3)
TRIGL SERPL-MCNC: 79 MG/DL

## 2024-10-09 ENCOUNTER — PATIENT OUTREACH (OUTPATIENT)
Dept: CARE COORDINATION | Facility: CLINIC | Age: 29
End: 2024-10-09
Payer: COMMERCIAL

## 2024-10-09 LAB — TESTOST SERPL-MCNC: 23 NG/DL (ref 8–950)

## 2024-10-09 NOTE — Clinical Note
Hi Dr. Helton, Patient is scheduled for an assessment on 11/5/24 to talk SW Sheeba to enroll in Clinic Care Coordination. Thank you!

## 2024-10-09 NOTE — PROGRESS NOTES
Clinic Care Coordination Contact  [facility]/Voicemail    Clinical Data: Care Coordinator Outreach    Outreach Documentation Number of Outreach Attempt   10/9/2024   9:36 AM 1     Left message on patient's voicemail with call back information and requested return call.    Overview  Rabia fernando-  Assessment-behavioral health, financial assistance, utilization of services, Assistance with making and keeping appointments    Plan: Care Coordinator will try to reach patient again in 1-2 business days.      Rabia Garner  Community Health Worker  [facility] Care Coordination   [place], [facility], [place], [place], [place] and [facility]  Office: [phone]

## 2024-10-10 NOTE — PROGRESS NOTES
Clinic Care Coordination Contact  [facility]/Voicemail    Clinical Data: Care Coordinator Outreach    Outreach Documentation Number of Outreach Attempt   10/9/2024   9:36 AM 1   10/10/2024   9:14 AM 2   10/10/2024   9:19 AM 3       Left message on  patients mom Patricia's  voicemail with call back information and requested return call.    Plan: Care Coordinator will try to reach patient again in 1-2 business days.    Rabia Garner  Community Health Worker  [facility]  Clinic Care Coordination   [place], [facility], [place], [place], [place] and [facility]  Office: [phone]

## 2024-10-10 NOTE — PROGRESS NOTES
Clinic Care Coordination Contact  Community Health Worker Initial Outreach    CHW Initial Information Gathering:  Referral Source: PCP  Current living arrangement:: I live in a private home with family  Community Resources: Financial/Insurance (Medical Assistance)  No PCP office visit in Past Year: No (10/7/24 with Dr. Helton)  CHW Additional Questions  Micky active?: Yes  Patient sent Social Determinants of Health questionnaire?: Yes    Patient accepts CC: Yes. Patient scheduled for assessment with FRANCESCO Aly on 11/5/24 at 9:00. Patient noted desire to discuss:    - Support establishing with MH Therapy and support groups    - Financial Resources    - Support and assistance with making and keeping appointments.     ** For SW Assessment initially call patient for assessment if you have trouble reaching patient call patients mom Patricia @ [phone]- Consent to Communicate on file dated 5/22/18.    ** CHW sent Care Coordination Questionnaires through Micky Garner  [facility] Health Worker  [facility] Care Coordination   [facility], [facility]  Office: [phone]

## 2024-10-10 NOTE — PROGRESS NOTES
Clinic Care Coordination Contact  [facility]/Voicemail    Clinical Data: Care Coordinator Outreach    Outreach Documentation Number of Outreach Attempt   10/9/2024   9:36 AM 1   10/10/2024   9:14 AM 2       Left message on patient's voicemail with call back information and requested return call.    Plan: Care Coordinator will try to reach patient again in 1-2 business days.    Rabia Garner  Community Health Worker  [facility] Care Coordination   [place], [place], [place], [facility]  Office: [phone]

## 2024-10-14 ENCOUNTER — TELEPHONE (OUTPATIENT)
Dept: PLASTIC SURGERY | Facility: CLINIC | Age: 29
End: 2024-10-14
Payer: COMMERCIAL

## 2024-10-21 ENCOUNTER — TELEPHONE (OUTPATIENT)
Dept: FAMILY MEDICINE | Facility: CLINIC | Age: 29
End: 2024-10-21
Payer: COMMERCIAL

## 2024-10-21 NOTE — TELEPHONE ENCOUNTER
Retail Pharmacy Prior Authorization Team   Phone: [phone]        PA Initiation    Medication: TESTOSTERONE 20.25 MG/1.25GM (1.62%) TD GEL  Insurance Company: Other (see comments)Comment:  [facility]  Pharmacy Filling the Rx: [facility] [place]  - [place] - [address] [place] [address]  Filling Pharmacy Phone: [phone]  Filling Pharmacy Fax: [phone]  Start Date: 10/21/2024

## 2024-10-21 NOTE — TELEPHONE ENCOUNTER
Prior Authorization Retail Medication Request    Medication/Dose: testosterone (ANDROGEL 1.62 % PUMP) 20.25 MG/ACT gel  Diagnosis and ICD code (if different than what is on RX):  Same as RX  New/renewal/insurance change PA/secondary ins. PA: New    Insurance   Primary: [employer] [place]  Insurance ID:  Group # [id number]    Pharmacy Information: Same as RX    Clinic Information  Preferred routing pool for dept communication: [facility] [place] [facility]

## 2024-10-22 NOTE — TELEPHONE ENCOUNTER
Retail Pharmacy Prior Authorization Team   Phone: [phone]        Prior Authorization Approval    Medication: TESTOSTERONE 20.25 MG/1.25GM (1.62%) TD GEL  Authorization Effective Date: 10/21/2024  Authorization Expiration Date: 10/21/2025  Approved Dose/Quantity: 75 GRAM PER 30 DAYS  Reference #:     Insurance Company: Other (see comments)Comment:  [employer]  Expected CoPay: $    CoPay Card Available: No    Financial Assistance Needed:   Which Pharmacy is filling the prescription: [facility] [place] [address]  Pharmacy Notified: YES  Patient Notified: **Instructed pharmacy to notify patient when script is ready to /ship.**

## 2024-11-05 ENCOUNTER — PATIENT OUTREACH (OUTPATIENT)
Dept: NURSING | Facility: CLINIC | Age: 29
End: 2024-11-05
Payer: COMMERCIAL

## 2024-11-05 NOTE — PROGRESS NOTES
Clinic Care Coordination Contact  Clinic Care Coordination Contact  OUTREACH    Referral Information:  Referral Source: PCP    Primary Diagnosis: Psychosocial    Chief Complaint   Patient presents with    Clinic Care Coordination - Initial        Universal Utilization:   Clinic Utilization  Difficulty keeping appointments:: Yes  Compliance Concerns: No  No-Show Concerns: Yes  No PCP office visit in Past Year: No  Utilization      No Show Count (past year)  0             ED Visits  0             Hospital Admissions  0                    Current as of: 11/5/2024  2:15 AM                Clinical Concerns:  Current Medical Concerns:    Patient Active Problem List   Diagnosis    Gender dysphoria in adolescent and adult    Generalized anxiety disorder    Attention deficit hyperactivity disorder (ADHD), combined type    Major depressive disorder, recurrent episode, moderate (H)    Chronic rhinitis    Dyslipidemia    Vitamin D deficiency    Menstrual changes    Social anxiety disorder    Migraine headache        Current Behavioral Concerns: no current concerns       Education Provided to patient:     Therapy -    [facility]   [address], [address]  [place], [place] [id number] [phone]    [facility] [address] [place], [place] [id number] [phone]    Dentist -     [facility] [address] [place], [place] [id number] [phone]    [place] [address] [place], [place] [id number] [phone]    Ophthalmologist -   [facility] [address] [place], [place] [id number] [phone]    Dermatologist -     Lorena Doshi - [phone]  [facility]  [address]  [place] [address]    Rafy Fernandez or Zach Nolasco - [phone]  [facility] [address] [address]       Pain  Pain (GOAL):: Yes  Type:  Other  Location of chronic pain:: Head (migraine) hands arms & legs (self injury)  Progression: Intermittent  Description of pain: Aching, Tender, Throbbing  Chronic pain severity:: 6  Limitation of routine activities due to chronic pain:: Yes  Description: Able to do most things most days with some rest  Alleviating Factors: Rest, Ice, Pain Medication  Aggravating Factors: Inactivity, Other  Health Maintenance Reviewed: Due/Overdue   Health Maintenance Due   Topic Date Due    YEARLY PREVENTIVE VISIT  Never done    DEPRESSION ACTION PLAN  Never done    HIV SCREENING  Never done    HEPATITIS A IMMUNIZATION (1 of 2 - Risk 2-dose series) Never done    HEPATITIS B IMMUNIZATION (1 of 3 - 19+ 3-dose series) Never done    PAP  Never done    MENTAL HEALTH TX PLAN  04/03/2016    INFLUENZA VACCINE (1) 09/01/2024    COVID-19 Vaccine (5 - 2024-25 season) 09/01/2024      Clinical Pathway: None    Medication Management:  Medication review status: Medications reviewed and no changes reported per patient.             Functional Status:  Dependent ADLs:: Independent  Dependent IADLs:: Independent, Cleaning, Meal Preparation, Medication Management, Money Management  Bed or wheelchair confined:: No  Mobility Status: Independent  Fallen 2 or more times in the past year?: No  Any fall with injury in the past year?: No    Living Situation:  Current living arrangement:: I live in a private home with family  [facility]    Lifestyle & Psychosocial Needs:    Social Drivers of Health     Food Insecurity: Low Risk  (10/20/2024)    Food Insecurity     Within the past 12 months, did you worry that your food would run out before you got money to buy more?: No     Within the past 12 months, did the food you bought just not last and you didn t have money to get more?: No   Depression: At risk (9/11/2024)    PHQ-2     PHQ-2 Score: 4   Housing Stability: High Risk (10/20/2024)    Housing Stability     Do you have  housing? : Yes     Are you worried about losing your housing?: Yes   Tobacco Use: Low Risk  (10/7/2024)    Patient History     Smoking Tobacco Use: Never     Smokeless Tobacco Use: Never     Passive Exposure: Never   Financial Resource Strain: Low Risk  (10/20/2024)    Financial Resource Strain     Within the past 12 months, have you or your family members you live with been unable to get utilities (heat, electricity) when it was really needed?: No   Alcohol Use: Not At Risk (10/20/2024)    AUDIT-C     Frequency of Alcohol Consumption: Monthly or less     Average Number of Drinks: 1 or 2     Frequency of Binge Drinking: Never   Transportation Needs: Low Risk  (10/20/2024)    Transportation Needs     Within the past 12 months, has lack of transportation kept you from medical appointments, getting your medicines, non-medical meetings or appointments, work, or from getting things that you need?: No   Physical Activity: Unknown (10/20/2024)    Exercise Vital Sign     Days of Exercise per Week: 0 days     Minutes of Exercise per Session: Patient declined   Interpersonal Safety: Low Risk  (10/7/2024)    Interpersonal Safety     Do you feel physically and emotionally safe where you currently live?: Yes     Within the past 12 months, have you been hit, slapped, kicked or otherwise physically hurt by someone?: No     Within the past 12 months, have you been humiliated or emotionally abused in other ways by your partner or ex-partner?: No   Stress: Stress Concern Present (10/20/2024)    [employer] [place] [employer] - Occupational Stress Questionnaire     Feeling of Stress : Rather much   Social Connections: Socially Isolated (10/20/2024)    Social Connection and Isolation Panel [NHANES]     Frequency of Communication with Friends and Family: Never     Frequency of Social Gatherings with Friends and Family: Once a week     Attends Alevism Services: Never     Active Member of Clubs or Organizations: No     Attends  Club or Organization Meetings: Patient declined     Marital Status: Never    Health Literacy: Not on file     Diet:: Regular  Inadequate nutrition (GOAL):: Yes  Tube Feeding: No  Inadequate activity/exercise (GOAL):: Yes  Significant changes in sleep pattern (GOAL): Yes  Transportation means:: Regular car     Islam or spiritual beliefs that impact treatment:: No  Mental health DX:: Yes  Mental health DX how managed:: Medication  Mental health management concern (GOAL):: No  Chemical Dependency Status: No Current Concerns  Informal Support system:: Friends, Parent             Resources and Interventions:  Current Resources:      Community Resources: Financial/Insurance  Supplies Currently Used at Home: Nutritional Supplements, Other  Equipment Currently Used at Home: none  Employment Status: employed full-time, seasonal worker         Advance Care Plan/Directive  Advanced Care Plans/Directives on file:: No    Referrals Placed: Mental Health         Care Plan:  Care Plan: Health Maintenance       Problem: Health Maintenance Due or Overdue       Goal: Become up-to-date with health maintenance visit(s)       Start Date: 11/5/2024    Note:     Goal Statement: I will complete my annual wellness visit.    Barriers: insurance changes   Strengths: accepting of help    Patient expressed understanding of goal: yes  Action steps to achieve this goal:  1. I will schedule my annual wellness visit; [phone] ([phone])  2. I will attend my annual wellness visit.  3. I will contact my Care Management or clinic team if I have barriers to attending my annual wellness visit.                              Care Plan: Mental Health       Problem: Mental Health Symptoms Need Improvement       Goal: Improve management of mental health symptoms and establish with mental health/psychosocial supports       Start Date: 11/5/2024    Note:     Goal Statement: I would like to establish with a therapist  Strengths: accepting of  help  Barriers: insurance changes  Patient expressed understanding of goal: yes  Action steps to achieve this goal:  1. I will review mental health resources provided by my care coordinator and consider establishing with supports.  2. I will call to schedule an appointment to establish with a therapist.                                     Care Plan: Establish Care       Problem: Patient is in need of specialty care       Goal: Establish consistent relationship with specialist(s) as needed       Start Date: 11/5/2024    Note:     Goal Statement: I will continue to take steps to establish with a(n) dental provider, ophthalmologist, and dermatologist  in the next three month(s).  Barriers: insurance changes   Strengths: accepting of help  Patient expressed understanding of goal: yes    Action steps to achieve this goal:  I will call to schedule an initial appointment to establish care.  I will follow up with scheduled appointments as recommended  I will continue to outreach to care coordination as needed for additional resources or supports.                                  Patient/Caregiver understanding: Pt reports understanding and denies any additional questions or concerns at this times. SW CC engaged in AIDET communication during encounter.     Outreach Frequency: monthly, more frequently as needed  Future Appointments                In 2 months Alexis Helton,  [facility], [facility]            Plan: [facility]. CC completed handoff to [facility]. CC provided resources via phone and my chart. [facility] will complete outreach in about 4 week. [facility] will complete chart review in about 6 weeks. [facility] reviewed care coordination role and availability with pt. [facility] discussed resources and supports available. Resources discussed: referrals/speciality care. Pt noted they are currently needing to get connected with a dentist, optometrist, dermatologist, and therapy. Pt tried  to connect with a dentist but they were not taking new patients. They have not tried anyone else. Pt will review resources sent by this [facility] and reach out for an appointment. No other questions or concerns at this time. Pt noted they might think of other things once they are established with the appointments they are needing and they will reach out to this [facility] with any concerns or questions. Pt noted they are having issues with their phone; to continue to reach out to their mom if not able to get in touch with pt directly.

## 2024-11-10 ENCOUNTER — MYC REFILL (OUTPATIENT)
Dept: FAMILY MEDICINE | Facility: CLINIC | Age: 29
End: 2024-11-10
Payer: COMMERCIAL

## 2024-11-10 DIAGNOSIS — F90.1 ATTENTION-DEFICIT HYPERACTIVITY DISORDER, PREDOMINANTLY HYPERACTIVE TYPE: ICD-10-CM

## 2024-11-10 DIAGNOSIS — F64.0 GENDER DYSPHORIA IN ADULT: ICD-10-CM

## 2024-11-11 ENCOUNTER — PATIENT OUTREACH (OUTPATIENT)
Dept: CARE COORDINATION | Facility: CLINIC | Age: 29
End: 2024-11-11
Payer: COMMERCIAL

## 2024-11-11 RX ORDER — TESTOSTERONE 1.62 MG/G
2 GEL TRANSDERMAL DAILY
Qty: 75 G | Refills: 0 | Status: SHIPPED | OUTPATIENT
Start: 2024-11-11

## 2024-11-11 RX ORDER — DEXTROAMPHETAMINE SACCHARATE, AMPHETAMINE ASPARTATE, DEXTROAMPHETAMINE SULFATE AND AMPHETAMINE SULFATE 1.25; 1.25; 1.25; 1.25 MG/1; MG/1; MG/1; MG/1
5 TABLET ORAL DAILY
Qty: 30 TABLET | Refills: 0 | Status: SHIPPED | OUTPATIENT
Start: 2024-11-11

## 2024-11-11 NOTE — PROGRESS NOTES
Clinic Care Coordination Contact  Follow Up Progress Note      Assessment: CC reached out to pt to check in after receiving my chart message from pt     Care Gaps:    Health Maintenance Due   Topic Date Due    YEARLY PREVENTIVE VISIT  Never done    DEPRESSION ACTION PLAN  Never done    HIV SCREENING  Never done    HEPATITIS A IMMUNIZATION (1 of 2 - Risk 2-dose series) Never done    HEPATITIS B IMMUNIZATION (1 of 3 - 19+ 3-dose series) Never done    PAP  Never done    MENTAL HEALTH TX PLAN  04/03/2016    INFLUENZA VACCINE (1) 09/01/2024    COVID-19 Vaccine (5 - 2024-25 season) 09/01/2024       Postponed to next outreach     Care Plans  Care Plan: Health Maintenance       Problem: Health Maintenance Due or Overdue       Goal: Become up-to-date with health maintenance visit(s)       Start Date: 11/5/2024    Note:     Goal Statement: I will complete my annual wellness visit.    Barriers: insurance changes   Strengths: accepting of help    Patient expressed understanding of goal: yes  Action steps to achieve this goal:  1. I will schedule my annual wellness visit; [phone] ([phone])  2. I will attend my annual wellness visit.  3. I will contact my Care Management or clinic team if I have barriers to attending my annual wellness visit.                              Care Plan: Mental Health       Problem: Mental Health Symptoms Need Improvement       Goal: Improve management of mental health symptoms and establish with mental health/psychosocial supports       Start Date: 11/5/2024    Note:     Goal Statement: I would like to establish with a therapist  Strengths: accepting of help  Barriers: insurance changes  Patient expressed understanding of goal: yes  Action steps to achieve this goal:  1. I will review mental health resources provided by my care coordinator and consider establishing with supports.  2. I will call to schedule an appointment to establish with a therapist.                                      Care Plan: Establish Care       Problem: Patient is in need of specialty care       Goal: Establish consistent relationship with specialist(s) as needed       Start Date: 11/5/2024    Note:     Goal Statement: I will continue to take steps to establish with a(n) dental provider, ophthalmologist, and dermatologist  in the next three month(s).  Barriers: insurance changes   Strengths: accepting of help  Patient expressed understanding of goal: yes    Action steps to achieve this goal:  I will call to schedule an initial appointment to establish care.  I will follow up with scheduled appointments as recommended  I will continue to outreach to care coordination as needed for additional resources or supports.                                  Intervention/Education provided during outreach:     Therapy -     [facility]   [address]  [place], [place] [id number] [phone]    Dentist -      [place] [address] [place], [place] [id number] [phone]     [place] [address] [place], [place] [address] [phone]    Dermatologist -      Lorena Doshi - [phone]  [facility]  [address]  [place] [address]     Rafy Fernandez or Zach Nolasco - [phone]  [facility]  [address] [place] [address]     Mammogram -     [facility]  [phone]  [address] [address]    Optometrist -     [facility] - [email or web address]  [address] [place] [address]  [phone]    Steps to care for yourself    Call the local crisis resources below if needed.  Contact friends or family for support.  Do activities you enjoy.  Eat a well-balanced diet and drink plenty of fluids.  Rest as needed.  Limit alcohol and recreational drugs. These can worsen depression.    When to contact your primary care provider      You have thoughts of harming or killing yourself but have not made a plan to carry it out.  Your depression gets in the way of daily activities.  You are often unable to sleep.  You need help cutting back on alcohol or recreational drugs.    When to call 911 or go to the Emergency Room     Get emergency help right away if you have any of the following:  You are planning to harm or kill yourself and you have a way to carry out the plan.   You have injured yourself or others. Or, you think you will.  You feel confused or are having trouble thinking or remembering.  You are having delusions (false beliefs).  You are hearing voices or seeing things that aren t there.  You are feeling psychotic (paranoid, fearful, restless, agitated, nervous, racing thoughts or speech)    Crisis Resources     These hotlines are for both adults and children. The and are open 24 hours a day, 7 days a week.    National Suicide Prevention Lifeline   1-035-535-TALK (4263)    Crisis Text Line    www.crisistextlTextMaster.org  Text HOME to 355689 from anywhere in the [place], anytime, about any type of crisis. A live, trained crisis counselor will receive the text and respond quickly.  For [facility] crisis numbers, visit the [facility] website at:  https://[place].gov/dhs/people-we-serve/adults/health-care/mental-health/resources/crisis-contacts.jsp    Plan:   CCC team and pt will work together to address needed appointments. Pt contracted for safety and noted they felt very overwhelmed over the weekend. Pt stated they would not hurt themselves, had no plan or means. Pt stated they have crisis help line and will reach out as needed.     Pt will reach out to dentist and therapy tomorrow.     [facility] will provide additional option for mammogram via my chart.     At next outreach - [facility] CC will assist with making appointments as needed.   [facility] Care Coordinator will follow up in about one week. CC will complete chart review in about 30 days.

## 2024-11-15 ENCOUNTER — PATIENT OUTREACH (OUTPATIENT)
Dept: CARE COORDINATION | Facility: CLINIC | Age: 29
End: 2024-11-15
Payer: COMMERCIAL

## 2024-11-15 NOTE — PROGRESS NOTES
Clinic Care Coordination Contact    Situation: Patient chart reviewed by care coordinator.    Background: [facility] received my chart message from Pt    Assessment: Based on my chart messages concerning self harm; [facility] consulted with Supervisor Angely Gilmore.     Plan/Recommendations:   -[facility] reached out to PCP to give update on content of messages and update on next steps: reach out to pt and based on reaching pt or not; completing welfare check. PCP was in agreement.   -[facility] reached out to pt; pt did not answer.   -[facility] reached out to [facility] non-emergency to request welfare check; was transferred to [place]: embedded SW with [facility] police. [facility] and [place] reviewed concerns; [place] requested mental health mobile crisis to complete welfare check; will keep this [facility] updated.   -[facility] updated PCP and supervisor.   -PCP gave recommendation for Dr. Andrey Garcia who is the psychologist that comes to [facility] and has a special interest in gender - might be able to get pt soon. [facility] will address this after welfare check is complete.    -[facility] will give update on outcome to PCP and supervisor.

## 2024-12-14 ENCOUNTER — E-VISIT (OUTPATIENT)
Dept: FAMILY MEDICINE | Facility: CLINIC | Age: 29
End: 2024-12-14
Payer: COMMERCIAL

## 2024-12-14 DIAGNOSIS — F90.1 ATTENTION-DEFICIT HYPERACTIVITY DISORDER, PREDOMINANTLY HYPERACTIVE TYPE: Primary | ICD-10-CM

## 2024-12-14 ASSESSMENT — ANXIETY QUESTIONNAIRES
6. BECOMING EASILY ANNOYED OR IRRITABLE: NEARLY EVERY DAY
5. BEING SO RESTLESS THAT IT IS HARD TO SIT STILL: SEVERAL DAYS
GAD7 TOTAL SCORE: 11
4. TROUBLE RELAXING: MORE THAN HALF THE DAYS
8. IF YOU CHECKED OFF ANY PROBLEMS, HOW DIFFICULT HAVE THESE MADE IT FOR YOU TO DO YOUR WORK, TAKE CARE OF THINGS AT HOME, OR GET ALONG WITH OTHER PEOPLE?: SOMEWHAT DIFFICULT
7. FEELING AFRAID AS IF SOMETHING AWFUL MIGHT HAPPEN: MORE THAN HALF THE DAYS
GAD7 TOTAL SCORE: 11
GAD7 TOTAL SCORE: 11
2. NOT BEING ABLE TO STOP OR CONTROL WORRYING: NOT AT ALL
1. FEELING NERVOUS, ANXIOUS, OR ON EDGE: MORE THAN HALF THE DAYS
7. FEELING AFRAID AS IF SOMETHING AWFUL MIGHT HAPPEN: MORE THAN HALF THE DAYS
IF YOU CHECKED OFF ANY PROBLEMS ON THIS QUESTIONNAIRE, HOW DIFFICULT HAVE THESE PROBLEMS MADE IT FOR YOU TO DO YOUR WORK, TAKE CARE OF THINGS AT HOME, OR GET ALONG WITH OTHER PEOPLE: SOMEWHAT DIFFICULT
3. WORRYING TOO MUCH ABOUT DIFFERENT THINGS: SEVERAL DAYS

## 2024-12-14 ASSESSMENT — PATIENT HEALTH QUESTIONNAIRE - PHQ9
10. IF YOU CHECKED OFF ANY PROBLEMS, HOW DIFFICULT HAVE THESE PROBLEMS MADE IT FOR YOU TO DO YOUR WORK, TAKE CARE OF THINGS AT HOME, OR GET ALONG WITH OTHER PEOPLE: EXTREMELY DIFFICULT
SUM OF ALL RESPONSES TO PHQ QUESTIONS 1-9: 22
SUM OF ALL RESPONSES TO PHQ QUESTIONS 1-9: 22

## 2024-12-16 RX ORDER — DEXTROAMPHETAMINE SACCHARATE, AMPHETAMINE ASPARTATE, DEXTROAMPHETAMINE SULFATE AND AMPHETAMINE SULFATE 2.5; 2.5; 2.5; 2.5 MG/1; MG/1; MG/1; MG/1
10 TABLET ORAL DAILY
Qty: 30 TABLET | Refills: 0 | Status: SHIPPED | OUTPATIENT
Start: 2024-12-16

## 2024-12-17 ENCOUNTER — TELEPHONE (OUTPATIENT)
Dept: FAMILY MEDICINE | Facility: CLINIC | Age: 29
End: 2024-12-17
Payer: COMMERCIAL

## 2024-12-17 DIAGNOSIS — F41.1 GENERALIZED ANXIETY DISORDER: Primary | ICD-10-CM

## 2024-12-17 NOTE — TELEPHONE ENCOUNTER
Medication Question or Refill        What medication are you calling about (include dose and sig)?:    Disp Refills Start End CORBIN   busPIRone (BUSPAR) 10 MG tablet (Discontinued) 90 tablet 1 9/11/2024 10/7/2024 No   Sig - Route: Take 1 tablet (10 mg) by mouth daily. - Oral   Patient not taking: Reported on 10/7/2024   Sent to pharmacy as: busPIRone HCl 10 MG Oral Tablet (BUSPAR)   Class: E-Prescribe   Reason for Discontinue: Therapy completed (No AVS)   Order: [id number]   E-Prescribing Status: Receipt confirmed by pharmacy (9/11/2024  1:09 PM CDT)   E-Cancel Status: Request approved by pharmacy (10/7/2024  3:32 PM CDT)       E-Cancel Status Note: Some or all of Rx received; 09/13/2024       Preferred Pharmacy:  [address] [address]  Phone: [phone] Fax: [phone]      Controlled Substance Agreement on file:   CSA -- Patient Level:    CSA: None found at the patient level.       Who prescribed the medication?: Sofia Domingo    Do you need a refill? Yes

## 2024-12-18 NOTE — TELEPHONE ENCOUNTER
"Routing to Dr. Helton to advise- looks like you had noted buspar to work well in the past.     Chandni BARDALES RN       Ov 10/7/24  Major depressive disorder, recurrent episode, moderate (H)  Generalized anxiety disorder  Was on Zoloft in the past -   Has been on multiple selective serotonin reuptake inhibitor's in the past -   Buspar worked well for this patient for the anxiety  Tried Adderrall in the past and that worked well  Tried Wellbutrin in the past -- made \"uselessly drowsy\" and unable to drive  We will resume Adderall 5 mg in the morning and titrate up as appropriate  Patient was instructed to call us and let us know if she has increased suicidality ideation  We could also consider Lexapro  - Primary Care - Care Coordination Referral; Future    "

## 2024-12-19 RX ORDER — BUSPIRONE HYDROCHLORIDE 10 MG/1
10 TABLET ORAL 2 TIMES DAILY
Qty: 180 TABLET | Refills: 1 | Status: SHIPPED | OUTPATIENT
Start: 2024-12-19 | End: 2025-06-17

## 2025-01-07 ENCOUNTER — OFFICE VISIT (OUTPATIENT)
Dept: FAMILY MEDICINE | Facility: CLINIC | Age: 30
End: 2025-01-07

## 2025-01-07 VITALS
OXYGEN SATURATION: 99 % | BODY MASS INDEX: 17.66 KG/M2 | TEMPERATURE: 98.4 F | WEIGHT: 109.9 LBS | HEART RATE: 63 BPM | SYSTOLIC BLOOD PRESSURE: 116 MMHG | DIASTOLIC BLOOD PRESSURE: 79 MMHG | HEIGHT: 66 IN

## 2025-01-07 DIAGNOSIS — F41.1 GENERALIZED ANXIETY DISORDER: ICD-10-CM

## 2025-01-07 DIAGNOSIS — L72.0 EPIDERMAL CYST OF FACE: ICD-10-CM

## 2025-01-07 DIAGNOSIS — F90.1 ATTENTION-DEFICIT HYPERACTIVITY DISORDER, PREDOMINANTLY HYPERACTIVE TYPE: Primary | ICD-10-CM

## 2025-01-07 DIAGNOSIS — F64.0 GENDER DYSPHORIA IN ADULT: ICD-10-CM

## 2025-01-07 DIAGNOSIS — L73.9 FOLLICULITIS: ICD-10-CM

## 2025-01-07 LAB
ALBUMIN SERPL BCG-MCNC: 4.8 G/DL (ref 3.5–5.2)
ALP SERPL-CCNC: 60 U/L (ref 40–150)
ALT SERPL W P-5'-P-CCNC: 6 U/L (ref 0–70)
AST SERPL W P-5'-P-CCNC: 15 U/L (ref 0–45)
BASOPHILS # BLD AUTO: 0 10E3/UL (ref 0–0.2)
BASOPHILS NFR BLD AUTO: 1 %
BILIRUB DIRECT SERPL-MCNC: <0.2 MG/DL (ref 0–0.3)
BILIRUB SERPL-MCNC: 0.3 MG/DL
EOSINOPHIL # BLD AUTO: 0.1 10E3/UL (ref 0–0.7)
EOSINOPHIL NFR BLD AUTO: 1 %
ERYTHROCYTE [DISTWIDTH] IN BLOOD BY AUTOMATED COUNT: 11.6 % (ref 10–15)
ESTRADIOL SERPL-MCNC: 36 PG/ML
FASTING STATUS PATIENT QL REPORTED: YES
GLUCOSE SERPL-MCNC: 86 MG/DL (ref 70–99)
HCT VFR BLD AUTO: 42.5 % (ref 35–53)
HGB BLD-MCNC: 14.9 G/DL (ref 11.7–17.7)
IMM GRANULOCYTES # BLD: 0 10E3/UL
IMM GRANULOCYTES NFR BLD: 0 %
LYMPHOCYTES # BLD AUTO: 2 10E3/UL (ref 0.8–5.3)
LYMPHOCYTES NFR BLD AUTO: 49 %
MCH RBC QN AUTO: 31.6 PG (ref 26.5–33)
MCHC RBC AUTO-ENTMCNC: 35.1 G/DL (ref 31.5–36.5)
MCV RBC AUTO: 90 FL (ref 78–100)
MONOCYTES # BLD AUTO: 0.4 10E3/UL (ref 0–1.3)
MONOCYTES NFR BLD AUTO: 10 %
NEUTROPHILS # BLD AUTO: 1.6 10E3/UL (ref 1.6–8.3)
NEUTROPHILS NFR BLD AUTO: 39 %
PLATELET # BLD AUTO: 239 10E3/UL (ref 150–450)
PROT SERPL-MCNC: 7.5 G/DL (ref 6.4–8.3)
RBC # BLD AUTO: 4.72 10E6/UL (ref 3.8–5.9)
SHBG SERPL-SCNC: 85 NMOL/L (ref 11–135)
WBC # BLD AUTO: 4 10E3/UL (ref 4–11)

## 2025-01-07 PROCEDURE — 84403 ASSAY OF TOTAL TESTOSTERONE: CPT | Mod: 59

## 2025-01-07 PROCEDURE — 80076 HEPATIC FUNCTION PANEL: CPT

## 2025-01-07 PROCEDURE — 82670 ASSAY OF TOTAL ESTRADIOL: CPT

## 2025-01-07 PROCEDURE — 36415 COLL VENOUS BLD VENIPUNCTURE: CPT

## 2025-01-07 PROCEDURE — 99214 OFFICE O/P EST MOD 30 MIN: CPT

## 2025-01-07 PROCEDURE — 84270 ASSAY OF SEX HORMONE GLOBUL: CPT

## 2025-01-07 PROCEDURE — 85025 COMPLETE CBC W/AUTO DIFF WBC: CPT

## 2025-01-07 PROCEDURE — 96127 BRIEF EMOTIONAL/BEHAV ASSMT: CPT

## 2025-01-07 PROCEDURE — 84403 ASSAY OF TOTAL TESTOSTERONE: CPT

## 2025-01-07 PROCEDURE — 82947 ASSAY GLUCOSE BLOOD QUANT: CPT

## 2025-01-07 RX ORDER — DEXTROAMPHETAMINE SACCHARATE, AMPHETAMINE ASPARTATE MONOHYDRATE, DEXTROAMPHETAMINE SULFATE AND AMPHETAMINE SULFATE 5; 5; 5; 5 MG/1; MG/1; MG/1; MG/1
20 CAPSULE, EXTENDED RELEASE ORAL DAILY
Qty: 30 CAPSULE | Refills: 0 | Status: SHIPPED | OUTPATIENT
Start: 2025-02-06 | End: 2025-03-08

## 2025-01-07 RX ORDER — DEXTROAMPHETAMINE SACCHARATE, AMPHETAMINE ASPARTATE MONOHYDRATE, DEXTROAMPHETAMINE SULFATE AND AMPHETAMINE SULFATE 5; 5; 5; 5 MG/1; MG/1; MG/1; MG/1
20 CAPSULE, EXTENDED RELEASE ORAL DAILY
Qty: 30 CAPSULE | Refills: 0 | Status: SHIPPED | OUTPATIENT
Start: 2025-01-07 | End: 2025-02-06

## 2025-01-07 RX ORDER — DEXTROAMPHETAMINE SACCHARATE, AMPHETAMINE ASPARTATE MONOHYDRATE, DEXTROAMPHETAMINE SULFATE AND AMPHETAMINE SULFATE 5; 5; 5; 5 MG/1; MG/1; MG/1; MG/1
20 CAPSULE, EXTENDED RELEASE ORAL DAILY
Qty: 30 CAPSULE | Refills: 0 | Status: SHIPPED | OUTPATIENT
Start: 2025-03-08 | End: 2025-04-07

## 2025-01-07 ASSESSMENT — ANXIETY QUESTIONNAIRES
IF YOU CHECKED OFF ANY PROBLEMS ON THIS QUESTIONNAIRE, HOW DIFFICULT HAVE THESE PROBLEMS MADE IT FOR YOU TO DO YOUR WORK, TAKE CARE OF THINGS AT HOME, OR GET ALONG WITH OTHER PEOPLE: VERY DIFFICULT
7. FEELING AFRAID AS IF SOMETHING AWFUL MIGHT HAPPEN: MORE THAN HALF THE DAYS
GAD7 TOTAL SCORE: 13
IF YOU CHECKED OFF ANY PROBLEMS ON THIS QUESTIONNAIRE, HOW DIFFICULT HAVE THESE PROBLEMS MADE IT FOR YOU TO DO YOUR WORK, TAKE CARE OF THINGS AT HOME, OR GET ALONG WITH OTHER PEOPLE: EXTREMELY DIFFICULT
6. BECOMING EASILY ANNOYED OR IRRITABLE: NEARLY EVERY DAY
GAD7 TOTAL SCORE: 13
1. FEELING NERVOUS, ANXIOUS, OR ON EDGE: MORE THAN HALF THE DAYS
8. IF YOU CHECKED OFF ANY PROBLEMS, HOW DIFFICULT HAVE THESE MADE IT FOR YOU TO DO YOUR WORK, TAKE CARE OF THINGS AT HOME, OR GET ALONG WITH OTHER PEOPLE?: EXTREMELY DIFFICULT
5. BEING SO RESTLESS THAT IT IS HARD TO SIT STILL: NEARLY EVERY DAY
3. WORRYING TOO MUCH ABOUT DIFFERENT THINGS: MORE THAN HALF THE DAYS
GAD7 TOTAL SCORE: 17
6. BECOMING EASILY ANNOYED OR IRRITABLE: NEARLY EVERY DAY
3. WORRYING TOO MUCH ABOUT DIFFERENT THINGS: NEARLY EVERY DAY
5. BEING SO RESTLESS THAT IT IS HARD TO SIT STILL: SEVERAL DAYS
4. TROUBLE RELAXING: NEARLY EVERY DAY
7. FEELING AFRAID AS IF SOMETHING AWFUL MIGHT HAPPEN: MORE THAN HALF THE DAYS
1. FEELING NERVOUS, ANXIOUS, OR ON EDGE: MORE THAN HALF THE DAYS
2. NOT BEING ABLE TO STOP OR CONTROL WORRYING: SEVERAL DAYS
GAD7 TOTAL SCORE: 13
2. NOT BEING ABLE TO STOP OR CONTROL WORRYING: NOT AT ALL
7. FEELING AFRAID AS IF SOMETHING AWFUL MIGHT HAPPEN: MORE THAN HALF THE DAYS

## 2025-01-07 ASSESSMENT — PATIENT HEALTH QUESTIONNAIRE - PHQ9
SUM OF ALL RESPONSES TO PHQ QUESTIONS 1-9: 22
10. IF YOU CHECKED OFF ANY PROBLEMS, HOW DIFFICULT HAVE THESE PROBLEMS MADE IT FOR YOU TO DO YOUR WORK, TAKE CARE OF THINGS AT HOME, OR GET ALONG WITH OTHER PEOPLE: EXTREMELY DIFFICULT
5. POOR APPETITE OR OVEREATING: NEARLY EVERY DAY
SUM OF ALL RESPONSES TO PHQ QUESTIONS 1-9: 22

## 2025-01-07 NOTE — PROGRESS NOTES
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  They do not have any active suicidal plan at this time  I suspect that if we can get the patient's ADHD symptoms under control this will assist in affecting the patient's mood as they take great pride in their work and contribution to society  - EMOTIONAL / BEHAVIORAL ASSESSMENT  - PHQ-9  - PHQ-A  - amphetamine-dextroamphetamine (ADDERALL XR) 20 MG 24 hr capsule; Take 1 capsule (20 mg) by mouth daily.  - amphetamine-dextroamphetamine (ADDERALL XR) 20 MG 24 hr capsule; Take 1 capsule (20 mg) by mouth daily.  - amphetamine-dextroamphetamine (ADDERALL XR) 20 MG 24 hr capsule; Take 1 capsule (20 mg) by mouth daily.    Gender dysphoria in adult  Reports increased libido  Reports increased acne  Has had some clitorimegaly but unaware of this is from the past  Unaware of last menses  Currently doing 2 pumps of AndroGel which is approximately 40.5 mg of testosterone daily  Currently putting on the back however due to small body habitus they struggle with getting appropriate  coverage  We will check estradiol as well as testosterone levels and liver function to ensure there is no side effects histologically  Unsure  - Estradiol; Future  - Testosterone Free and Total; Future  - Hepatic panel (Albumin, ALT, AST, Bili, Alk Phos, TP); Future  - Hemoglobin and hematocrit; Future  - Glucose; Future  - Glucose  - CBC with Diff Plt  - Hepatic Panel  - Testosterone total  - Estradiol  - Testosterone Free and Total    Folliculitis  Epidermal cyst of face  Multiple lesions on the face that are nonbleeding and nontender but cause distress and the patient  Will send to dermatology for removal from the face          Depression Screening Follow Up        1/7/2025     9:49 AM   PHQ   PHQ-9 Total Score 22    Q9: Thoughts of better off dead/self-harm past 2 weeks Nearly every day   F/U: Thoughts of suicide or self-harm Yes   F/U: Self harm-plan Yes   F/U: Self-harm action Yes   F/U: Safety concerns No       Patient-reported         1/7/2025     9:49 AM   Last PHQ-9   1.  Little interest or pleasure in doing things 3   2.  Feeling down, depressed, or hopeless 1   3.  Trouble falling or staying asleep, or sleeping too much 3   4.  Feeling tired or having little energy 3   5.  Poor appetite or overeating 3   6.  Feeling bad about yourself 3   7.  Trouble concentrating 3   8.  Moving slowly or restless 0   Q9: Thoughts of better off dead/self-harm past 2 weeks 3   PHQ-9 Total Score 22    In the past two weeks have you had thoughts of suicide or self harm? Yes   Do you have concerns about your personal safety or the safety of others? No   In the past 2 weeks have you thought about a plan or had intention to harm yourself? Yes   In the past 2 weeks have you acted on these thoughts in any way? Yes       Patient-reported                   Follow Up Actions Taken  Crisis resource information provided in the After Visit Summary  Patient to follow up with PCP.  Clinic staff to schedule appointment if able.  Mental  "Health Referral placed    Discussed the following ways the patient can remain in a safe environment:  remove alcohol, remove drugs, dispose of old medications , and be around others        Jennifer Arthur is a 29 year old, presenting for the following health issues:  Follow Up ([facility] care )        1/7/2025     9:59 AM   Additional Questions   Roomed by WALT LEARY     History of Present Illness       Mental Health Follow-up:  Patient presents to follow-up on Depression & Anxiety.Patient's depression since last visit has been:  Better  The patient is having other symptoms associated with depression.  Patient's anxiety since last visit has been:  Medium  The patient is having other symptoms associated with anxiety.  Any significant life events: job concerns, financial concerns and health concerns  Patient is feeling anxious or having panic attacks.  Patient has no concerns about alcohol or drug use.    Reason for visit:  Continuing care    He eats 2-3 servings of fruits and vegetables daily.He consumes 0 sweetened beverage(s) daily.He exercises with enough effort to increase his heart rate 9 or less minutes per day.  He exercises with enough effort to increase his heart rate 3 or less days per week. He is missing 1 dose(s) of medications per week.  He is not taking prescribed medications regularly due to remembering to take.       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 Nice to have the schedule. Helps to have the consistency - first couple days were rough because of mother's hoarding  This week is 6 days a week, last was 4. Employed thru [employer].   Feels like the anxiety is helping somewhat    ADHD  \"Still rather a mess\"   struggle to get to places on time  Would like to go to the ADHD  - [place]  Missed the deadline  Has [facility]  Currently on the 5 mg " "when he gets up, first half of work doesn't have to do much. Struggles to take it to work because works with substance use patients and does not want to put them at risk    Gender Dysphoria  Currently doing 2 pumps daily -   Increased libido  Increased hair on the chin  Changes in skin- more oily  Unaware of genital growth  Unaware of menstrual cycles            Objective    /79 (BP Location: Right arm, Patient Position: Sitting, Cuff Size: Adult Small)   Pulse 63   Temp 98.4  F (36.9  C) (Oral)   Ht 1.683 m (5' 6.26\")   Wt 49.9 kg (109 lb 14.4 oz)   SpO2 99%   BMI 17.60 kg/m    Body mass index is 17.6 kg/m .  Physical Exam  Vitals reviewed.   Constitutional:       Appearance: Normal appearance.   HENT:      Head: Normocephalic and atraumatic.      Right Ear: External ear normal.      Left Ear: External ear normal.      Nose: Nose normal.      Mouth/Throat:      Mouth: Mucous membranes are moist.   Eyes:      Extraocular Movements: Extraocular movements intact.      Pupils: Pupils are equal, round, and reactive to light.   Cardiovascular:      Rate and Rhythm: Normal rate and regular rhythm.      Heart sounds: Normal heart sounds.   Pulmonary:      Effort: Pulmonary effort is normal.      Breath sounds: Normal breath sounds.   Abdominal:      General: Abdomen is flat. Bowel sounds are normal.      Palpations: Abdomen is soft.   Musculoskeletal:      Cervical back: Normal range of motion and neck supple.      Comments: Appropriate strength in tested fields   Skin:     General: Skin is warm and dry.      Comments: Raised hard papules on the face both notably on the nose and upper lip  Nodule on the right ankle   Neurological:      General: No focal deficit present.      Mental Status: He is alert.   Psychiatric:         Mood and Affect: Mood normal.         Behavior: Behavior normal.              Signed Electronically by: Alexis Helton, DO    "

## 2025-01-09 LAB
TESTOST FREE SERPL-MCNC: 1.98 NG/DL
TESTOST SERPL-MCNC: 204 NG/DL (ref 8–950)
TESTOST SERPL-MCNC: 254 NG/DL (ref 8–950)

## 2025-01-09 ASSESSMENT — ANXIETY QUESTIONNAIRES: GAD7 TOTAL SCORE: 17

## 2025-01-09 NOTE — RESULT ENCOUNTER NOTE
I have reviewed your diagnostic work and all appears appropriate.     Your testosterone is on the lower side of where I would like.  I recommend increasing as discussed I would recommend going to 3 pumps per day of the AndroGel if you are able to.  We could also consider the patches or injections.  Please let us know which you like to go forward with    I hope the new ADHD medication is working well for you    Sincerely,    Dr. Helton

## 2025-01-13 ENCOUNTER — PATIENT OUTREACH (OUTPATIENT)
Dept: CARE COORDINATION | Facility: CLINIC | Age: 30
End: 2025-01-13

## 2025-01-13 NOTE — PROGRESS NOTES
Clinic Care Coordination Contact  [facility]/Voicemail    Clinical Data: Care Coordinator Outreach    Outreach Documentation Number of Outreach Attempt   12/4/2024  10:28 AM 2   1/13/2025   1:38 PM 1       Left message on patient's voicemail with call back information and requested return call.      Plan: Care Coordinator will try to reach patient again in 3-5 business days.

## 2025-03-25 ENCOUNTER — MYC REFILL (OUTPATIENT)
Dept: FAMILY MEDICINE | Facility: CLINIC | Age: 30
End: 2025-03-25
Payer: COMMERCIAL

## 2025-03-25 DIAGNOSIS — F64.0 GENDER DYSPHORIA IN ADULT: ICD-10-CM

## 2025-03-25 DIAGNOSIS — F90.1 ATTENTION-DEFICIT HYPERACTIVITY DISORDER, PREDOMINANTLY HYPERACTIVE TYPE: ICD-10-CM

## 2025-03-25 RX ORDER — DEXTROAMPHETAMINE SACCHARATE, AMPHETAMINE ASPARTATE MONOHYDRATE, DEXTROAMPHETAMINE SULFATE AND AMPHETAMINE SULFATE 5; 5; 5; 5 MG/1; MG/1; MG/1; MG/1
20 CAPSULE, EXTENDED RELEASE ORAL DAILY
Qty: 30 CAPSULE | Refills: 0 | Status: SHIPPED | OUTPATIENT
Start: 2025-03-25

## 2025-03-25 RX ORDER — TESTOSTERONE 1.62 MG/G
2 GEL TRANSDERMAL DAILY
Qty: 75 G | Refills: 0 | Status: SHIPPED | OUTPATIENT
Start: 2025-03-25

## 2025-04-22 ENCOUNTER — VIRTUAL VISIT (OUTPATIENT)
Dept: FAMILY MEDICINE | Facility: CLINIC | Age: 30
End: 2025-04-22
Payer: COMMERCIAL

## 2025-04-22 DIAGNOSIS — F90.1 ATTENTION-DEFICIT HYPERACTIVITY DISORDER, PREDOMINANTLY HYPERACTIVE TYPE: ICD-10-CM

## 2025-04-22 DIAGNOSIS — F41.1 GENERALIZED ANXIETY DISORDER: Primary | ICD-10-CM

## 2025-04-22 DIAGNOSIS — F33.1 MAJOR DEPRESSIVE DISORDER, RECURRENT EPISODE, MODERATE (H): Chronic | ICD-10-CM

## 2025-04-22 DIAGNOSIS — F64.0 GENDER DYSPHORIA IN ADULT: ICD-10-CM

## 2025-04-22 PROCEDURE — 98005 SYNCH AUDIO-VIDEO EST LOW 20: CPT

## 2025-04-22 PROCEDURE — 96127 BRIEF EMOTIONAL/BEHAV ASSMT: CPT | Mod: 95

## 2025-04-22 ASSESSMENT — PATIENT HEALTH QUESTIONNAIRE - PHQ9
SUM OF ALL RESPONSES TO PHQ QUESTIONS 1-9: 9
10. IF YOU CHECKED OFF ANY PROBLEMS, HOW DIFFICULT HAVE THESE PROBLEMS MADE IT FOR YOU TO DO YOUR WORK, TAKE CARE OF THINGS AT HOME, OR GET ALONG WITH OTHER PEOPLE: SOMEWHAT DIFFICULT
SUM OF ALL RESPONSES TO PHQ QUESTIONS 1-9: 9

## 2025-04-22 NOTE — PROGRESS NOTES
Jerson is a 29 year old who is being evaluated via a billable video visit.    How would you like to obtain your AVS? MyChart  If the video visit is dropped, the invitation should be resent by: Text to cell phone: [phone]  Will anyone else be joining your video visit? No      Assessment & Plan     Generalized anxiety disorder  Major depressive disorder, recurrent episode, moderate (H)  Attention-deficit hyperactivity disorder, predominantly hyperactive type    Patient has severe mental health past  Patient was previously on Zoloft however stopped this  Patient started biting themselves but has no current plan of suicidal ideation  When patients are getting road rage that is when they started taking their Zoloft again approximately 8 weeks ago because they are nervous about engaging neurologist  Most of their anxiety is related to environmental as their mom is a hoarder and it is distracting around the house multiple things as well as safety concerns  Patient's ultimate goal is to get out of the house and in their own apartment  Patient is currently working with a therapist  Per chart review patient has been on all SSRIs:  Paxil stop working  Switch to Zoloft and May be some improvement  They have also tried venlafaxine and duloxetine  We discussed increasing dose of Zoloft however this caused significant GI distress in the past  I did consider a short course of benzodiazepines due to the patient's increased anxiety and irritability however due to current suicidal ideation I would be hesitant to have patient have access to these medications  At this point I believe patient would benefit from seeing CCP S for the short-term to get stable on a medication as well has therapy support  Patient is agreeable to this  Discussed going for walks and thoughts of self-harm  Patient aware of empath programs      Gender dysphoria in adult  Patient has been on testosterone therapy for several months now  Getting more hair on  the chin but no other body hair  Has noticed muscular changes  This could be contributing to patient's anxiety however I believe with the dosage of AndroGel they are on it is appropriate  Will check lab work  They are in the office    The longitudinal plan of care for the diagnosis(es)/condition(s) as documented were addressed during this visit. Due to the added complexity in care, I will continue to support Jerson in the subsequent management and with ongoing continuity of care.          Depression Screening Follow Up        4/22/2025    10:20 AM   PHQ   PHQ-9 Total Score 9    Q9: Thoughts of better off dead/self-harm past 2 weeks Several days    F/U: Thoughts of suicide or self-harm Yes    F/U: Self harm-plan Yes    F/U: Self-harm action Yes    F/U: Safety concerns No        Proxy-reported                     Follow Up Actions Taken  Crisis resource information provided in the After Visit Summary  Mental Health Referral placed    Discussed the following ways the patient can remain in a safe environment:  remove drugs, dispose of old medications , and be around others      Subjective   Jerson is a 29 year old, presenting for the following health issues:  Medication Follow-up (ADHD med and testosterone - wondering how long after application of gel before being able to put shirt on  )      Video Start Time:  1032    History of Present Illness       Reason for visit:  Med follow up He is missing 1 dose(s) of medications per week.  He is not taking prescribed medications regularly due to remembering to take.        Patient was at home  Currently eating breakfast at home  Was supposed to be getting up at 730 to feed the dog but was just getting out    Was out in [place] to [facility] and dog watch for 3 weeks  Enjoyed himself because the home did not have the distractions that he experiences at home  Unfortunately now is back  Seeing the state of the house is stressful as mom is a hoarder  Mom also says many things to  the patient  Wanting to get a new job so can move out and get their own apartment    Mood has mostly been pretty good espeically when in [place] as the house was not in disarray  Had to monitor anxiety more than anything  Just got back yesterday from [place]. Stayed an extra week with them    Mood  A lot of it is environmental espeically with mom in the home  Finally tapped out on taking a break from zoloft - restarted on zoloft  Still biting himself  Started getting road rage when driving and that made him nervous for when it started to endanger other s  Started noticing a difference since restarting zoloft  Still taking adderrall Xr- not taking everyday - forgetting  Having trouble remembering taking the night time dose of Buspar  Recently started seeing a therpiast - likes him and is doing well with him - he works with [employer]    Noticed changes of Testosterone  More hair on the chin - likes that  Finding random hairs on shoulders  Body composition - some of the changes  More muscular              Objective           Vitals:  No vitals were obtained today due to virtual visit.    Physical Exam   GENERAL: alert and no distress  EYES: Eyes grossly normal to inspection.  No discharge or erythema, or obvious scleral/conjunctival abnormalities.  RESP: No audible wheeze, cough, or visible cyanosis.    SKIN: Visible skin clear. No significant rash, abnormal pigmentation or lesions.  NEURO: Cranial nerves grossly intact.  Mentation and speech appropriate for age.  PSYCH:tearful and distracted        Video-Visit Details    Type of service:  Video Visit   Video End Time: 1055  Originating Location (pt. Location): Home    Distant Location (provider location):  On-site  Platform used for Video Visit: Lai  Signed Electronically by: Alexis Helton DO

## 2025-06-29 DIAGNOSIS — F41.1 GENERALIZED ANXIETY DISORDER: ICD-10-CM

## 2025-06-30 ENCOUNTER — TELEPHONE (OUTPATIENT)
Dept: FAMILY MEDICINE | Facility: CLINIC | Age: 30
End: 2025-06-30
Payer: COMMERCIAL

## 2025-06-30 NOTE — TELEPHONE ENCOUNTER
Outgoing call to patient. No answer. LMTCB. When pt returns call please relay message below and assist pt in scheduling appointment. Thank you.

## 2025-06-30 NOTE — TELEPHONE ENCOUNTER
Spoke with Pt and he said he only needs Adderall and not Buspar.  TC advised that PCP had sent over RX's for Adderall.  Pt states he never picked up the June RX.  He will call pharmacy and let us know if he needs another RX sent over or not.     [place]

## 2025-06-30 NOTE — TELEPHONE ENCOUNTER
Reason for Call:  Appointment Request    Patient requesting this type of appt:  Med-Check     Requested provider: Alexis Helton    Reason patient unable to be scheduled: Not within requested timeframe    When does patient want to be seen/preferred time: ASAP    Comments: Pt is wanting see pcp asap. He is currently out of some medications and slo low on others and will like to get scheduled asap if pcp is available     Could we send this information to you in [place] or would you prefer to receive a phone call?:   Patient would prefer a phone call   Okay to leave a detailed message?: Yes at Cell number on file:    Telephone Information:   Mobile [phone]       Call taken on 6/30/2025 at 5:48 PM by Riki Prado

## 2025-07-01 RX ORDER — BUSPIRONE HYDROCHLORIDE 10 MG/1
10 TABLET ORAL 2 TIMES DAILY
Qty: 180 TABLET | Refills: 1 | Status: SHIPPED | OUTPATIENT
Start: 2025-07-01

## 2025-07-14 ENCOUNTER — OFFICE VISIT (OUTPATIENT)
Dept: FAMILY MEDICINE | Facility: CLINIC | Age: 30
End: 2025-07-14
Payer: COMMERCIAL

## 2025-07-14 ENCOUNTER — MYC MEDICAL ADVICE (OUTPATIENT)
Dept: FAMILY MEDICINE | Facility: CLINIC | Age: 30
End: 2025-07-14

## 2025-07-14 VITALS
SYSTOLIC BLOOD PRESSURE: 118 MMHG | OXYGEN SATURATION: 97 % | BODY MASS INDEX: 18.9 KG/M2 | WEIGHT: 118 LBS | DIASTOLIC BLOOD PRESSURE: 68 MMHG | RESPIRATION RATE: 16 BRPM | HEART RATE: 85 BPM

## 2025-07-14 DIAGNOSIS — F33.1 MAJOR DEPRESSIVE DISORDER, RECURRENT EPISODE, MODERATE (H): Chronic | ICD-10-CM

## 2025-07-14 DIAGNOSIS — F40.10 SOCIAL ANXIETY DISORDER: Chronic | ICD-10-CM

## 2025-07-14 DIAGNOSIS — F90.2 ATTENTION DEFICIT HYPERACTIVITY DISORDER (ADHD), COMBINED TYPE: ICD-10-CM

## 2025-07-14 DIAGNOSIS — F64.0 GENDER DYSPHORIA IN ADULT: Primary | ICD-10-CM

## 2025-07-14 LAB
ALBUMIN SERPL BCG-MCNC: 4.4 G/DL (ref 3.5–5.2)
ALP SERPL-CCNC: 55 U/L (ref 40–150)
ALT SERPL W P-5'-P-CCNC: 6 U/L (ref 0–70)
AST SERPL W P-5'-P-CCNC: 15 U/L (ref 0–45)
BASOPHILS # BLD AUTO: 0 10E3/UL (ref 0–0.2)
BASOPHILS NFR BLD AUTO: 0 %
BILIRUB SERPL-MCNC: 0.6 MG/DL
BILIRUBIN DIRECT (ROCHE PRO & PURE): 0.23 MG/DL (ref 0–0.45)
CHOLEST SERPL-MCNC: 163 MG/DL
EOSINOPHIL # BLD AUTO: 0.2 10E3/UL (ref 0–0.7)
EOSINOPHIL NFR BLD AUTO: 3 %
ERYTHROCYTE [DISTWIDTH] IN BLOOD BY AUTOMATED COUNT: 11.8 % (ref 10–15)
FASTING STATUS PATIENT QL REPORTED: NO
FASTING STATUS PATIENT QL REPORTED: NO
GLUCOSE SERPL-MCNC: 79 MG/DL (ref 70–99)
HCT VFR BLD AUTO: 40.9 % (ref 35–53)
HDLC SERPL-MCNC: 59 MG/DL
HGB BLD-MCNC: 14.4 G/DL (ref 11.7–17.7)
IMM GRANULOCYTES # BLD: 0 10E3/UL
IMM GRANULOCYTES NFR BLD: 0 %
LDLC SERPL CALC-MCNC: 88 MG/DL
LYMPHOCYTES # BLD AUTO: 1.7 10E3/UL (ref 0.8–5.3)
LYMPHOCYTES NFR BLD AUTO: 31 %
MCH RBC QN AUTO: 31.6 PG (ref 26.5–33)
MCHC RBC AUTO-ENTMCNC: 35.2 G/DL (ref 31.5–36.5)
MCV RBC AUTO: 90 FL (ref 78–100)
MONOCYTES # BLD AUTO: 0.4 10E3/UL (ref 0–1.3)
MONOCYTES NFR BLD AUTO: 7 %
NEUTROPHILS # BLD AUTO: 3.2 10E3/UL (ref 1.6–8.3)
NEUTROPHILS NFR BLD AUTO: 59 %
NONHDLC SERPL-MCNC: 104 MG/DL
PLATELET # BLD AUTO: 236 10E3/UL (ref 150–450)
PROT SERPL-MCNC: 6.7 G/DL (ref 6.4–8.3)
RBC # BLD AUTO: 4.56 10E6/UL (ref 3.8–5.9)
TRIGL SERPL-MCNC: 80 MG/DL
WBC # BLD AUTO: 5.5 10E3/UL (ref 4–11)

## 2025-07-14 PROCEDURE — 36415 COLL VENOUS BLD VENIPUNCTURE: CPT

## 2025-07-14 PROCEDURE — 99214 OFFICE O/P EST MOD 30 MIN: CPT

## 2025-07-14 PROCEDURE — 84403 ASSAY OF TOTAL TESTOSTERONE: CPT

## 2025-07-14 PROCEDURE — G2211 COMPLEX E/M VISIT ADD ON: HCPCS

## 2025-07-14 PROCEDURE — 3078F DIAST BP <80 MM HG: CPT

## 2025-07-14 PROCEDURE — 80076 HEPATIC FUNCTION PANEL: CPT

## 2025-07-14 PROCEDURE — 82947 ASSAY GLUCOSE BLOOD QUANT: CPT

## 2025-07-14 PROCEDURE — 80061 LIPID PANEL: CPT

## 2025-07-14 PROCEDURE — 3074F SYST BP LT 130 MM HG: CPT

## 2025-07-14 PROCEDURE — 85025 COMPLETE CBC W/AUTO DIFF WBC: CPT

## 2025-07-14 RX ORDER — DEXTROAMPHETAMINE SACCHARATE, AMPHETAMINE ASPARTATE MONOHYDRATE, DEXTROAMPHETAMINE SULFATE AND AMPHETAMINE SULFATE 5; 5; 5; 5 MG/1; MG/1; MG/1; MG/1
20 CAPSULE, EXTENDED RELEASE ORAL DAILY
Qty: 30 CAPSULE | Refills: 0 | Status: SHIPPED | OUTPATIENT
Start: 2025-08-13 | End: 2025-07-14

## 2025-07-14 RX ORDER — DEXTROAMPHETAMINE SACCHARATE, AMPHETAMINE ASPARTATE MONOHYDRATE, DEXTROAMPHETAMINE SULFATE AND AMPHETAMINE SULFATE 5; 5; 5; 5 MG/1; MG/1; MG/1; MG/1
20 CAPSULE, EXTENDED RELEASE ORAL DAILY
Qty: 30 CAPSULE | Refills: 0 | Status: SHIPPED | OUTPATIENT
Start: 2025-09-12 | End: 2025-07-14

## 2025-07-14 RX ORDER — DEXTROAMPHETAMINE SACCHARATE, AMPHETAMINE ASPARTATE MONOHYDRATE, DEXTROAMPHETAMINE SULFATE AND AMPHETAMINE SULFATE 7.5; 7.5; 7.5; 7.5 MG/1; MG/1; MG/1; MG/1
30 CAPSULE, EXTENDED RELEASE ORAL DAILY
Qty: 30 CAPSULE | Refills: 0 | Status: SHIPPED | OUTPATIENT
Start: 2025-09-12 | End: 2025-10-12

## 2025-07-14 RX ORDER — DEXTROAMPHETAMINE SACCHARATE, AMPHETAMINE ASPARTATE MONOHYDRATE, DEXTROAMPHETAMINE SULFATE AND AMPHETAMINE SULFATE 5; 5; 5; 5 MG/1; MG/1; MG/1; MG/1
20 CAPSULE, EXTENDED RELEASE ORAL DAILY
Qty: 30 CAPSULE | Refills: 0 | Status: SHIPPED | OUTPATIENT
Start: 2025-07-14 | End: 2025-07-14

## 2025-07-14 RX ORDER — DEXTROAMPHETAMINE SACCHARATE, AMPHETAMINE ASPARTATE MONOHYDRATE, DEXTROAMPHETAMINE SULFATE AND AMPHETAMINE SULFATE 7.5; 7.5; 7.5; 7.5 MG/1; MG/1; MG/1; MG/1
30 CAPSULE, EXTENDED RELEASE ORAL DAILY
Qty: 30 CAPSULE | Refills: 0 | Status: SHIPPED | OUTPATIENT
Start: 2025-07-14 | End: 2025-08-13

## 2025-07-14 RX ORDER — DEXTROAMPHETAMINE SACCHARATE, AMPHETAMINE ASPARTATE MONOHYDRATE, DEXTROAMPHETAMINE SULFATE AND AMPHETAMINE SULFATE 7.5; 7.5; 7.5; 7.5 MG/1; MG/1; MG/1; MG/1
30 CAPSULE, EXTENDED RELEASE ORAL DAILY
Qty: 30 CAPSULE | Refills: 0 | Status: SHIPPED | OUTPATIENT
Start: 2025-08-13 | End: 2025-09-12

## 2025-07-14 RX ORDER — TESTOSTERONE 1.62 MG/G
3 GEL TRANSDERMAL DAILY
Qty: 75 G | Refills: 0 | Status: SHIPPED | OUTPATIENT
Start: 2025-07-14

## 2025-07-14 ASSESSMENT — PATIENT HEALTH QUESTIONNAIRE - PHQ9
SUM OF ALL RESPONSES TO PHQ QUESTIONS 1-9: 13
SUM OF ALL RESPONSES TO PHQ QUESTIONS 1-9: 13
10. IF YOU CHECKED OFF ANY PROBLEMS, HOW DIFFICULT HAVE THESE PROBLEMS MADE IT FOR YOU TO DO YOUR WORK, TAKE CARE OF THINGS AT HOME, OR GET ALONG WITH OTHER PEOPLE: EXTREMELY DIFFICULT

## 2025-07-14 NOTE — PROGRESS NOTES
Assessment & Plan       Gender dysphoria in adult    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    July 14, 2025  Goals for care:  Would like to go up on testosterone. Currently at 2 pump   Going up to three. Can use shoulders or thighs      Oily skin: gotten more oily  Body hair growth: getting neck and facial hair. Some changes on the body but not necessarily on this  Hair loss: change in the shape of hairline  Body fat redistributing: from hip to abd  Increased muscle mass: increased strength  Cessation of menses: approximatley monthly. lighter  Increased sex drive: went up a little bit and then antidepressant changed it  Clitoromegaly: increase in volume        Goals As Above  Check Hemoglobin and Testosterone every 3 Months  Notify Clinic of changes in mentation, bleeding, dizziness  Continue Care with other specialists    - testosterone (ANDROGEL 1.62 % PUMP) 20.25 MG/ACT gel; Place 3 Pump (60.75 mg) onto the skin daily.  - Testosterone total; Future  - Hepatic Panel; Future  - CBC with Diff Plt; Future  - Lipid Cascade; Future  - Glucose; Future  - Testosterone total  - Hepatic Panel  - CBC with Diff Plt  - Lipid Cascade  - Glucose    Major depressive disorder, recurrent episode, moderate (H)  Social anxiety disorder    Longstanding history of anxiety and depression  Continues to be surrounded around living switch duration with mother who is a hoarder  Does participate in self-harm like biting and hitting themselves  Concern for concussion  Highly recommend patient get back with therapist  Patient did not get set up with the [facility].  Will provide the number as the patient struggles with making phone calls.  I would like them to get stable with them and then they can return to me  Patient aware of empath    Attention deficit hyperactivity disorder (ADHD), combined type  Longstanding history of ADHD  I suspect that this is contributing the patient's anxiety because they are unable to get test done like they would  like  Patient was stable on Adderall XR however we switch this due to the dysregulated sleep schedule  Patient is getting better sleep schedule so we can return to the XR but we will increase to 30 mg  Will check in a couple weeks and see how patient is doing on this increased change  I suspect that with an increase control of ADHD will help with anxiety and other mental health disorders  Recommend the patient continue working for getting a job    The longitudinal plan of care for the diagnosis(es)/condition(s) as documented were addressed during this visit. Due to the added complexity in care, I will continue to support Jerson in the subsequent management and with ongoing continuity of care.    Depression Screening Follow Up        7/14/2025     2:31 PM   PHQ   PHQ-9 Total Score 13    Q9: Thoughts of better off dead/self-harm past 2 weeks Several days   F/U: Thoughts of suicide or self-harm Yes   F/U: Self harm-plan Yes   F/U: Self-harm action Yes   F/U: Safety concerns Yes       Patient-reported                     Follow Up Actions Taken  Crisis resource information provided in the After Visit Summary    Discussed the following ways the patient can remain in a safe environment:  be around others            Subjective   Jerson is a 29 year old, presenting for the following health issues:  Follow Up (Medication - wondering if there can be increase in testosterone dosage. Would like to go back to ER for Adderall )        7/14/2025     2:27 PM   Additional Questions   Roomed by WALT Moody   Accompanied by SELF     History of Present Illness       Reason for visit:  Fustercluck of comorbidities    He eats 4 or more servings of fruits and vegetables daily.He consumes 0 sweetened beverage(s) daily.He exercises with enough effort to increase his heart rate 9 or less minutes per day.  He exercises with enough effort to increase his heart rate 3 or less days per week. He is missing 1 dose(s) of medications per week.  He is  not taking prescribed medications regularly due to remembering to take.        Needs to call his therapist because hasn't seen since going to [place] - Sees Omari Santos at [facility] - works with ADHD and executive dysfunction.   Was in [place] housesitting and dog sitting    Testosterone  Feels like things are going too slow  Still getting menstrual cycles. Every month    Mood  Possibly gave himself a concussion last week from self harm  Has done this in the past a couple months ago  Thought it was migraine but didn't didn't go away for a couple days  Still biting himself intermittently  Getting angered easily.  Frustrated when making mistakes  When things gross him out: currently has house flies from his mother's hoarding  Things with mom are better   Was seeing a family therapist but the therapist is taking the summer off  Mom doesn't really want to see therapy  Doing well on the meds most days; when he runs out he struggles to get out of the house to get these filled  Struggles to take afternoon dose of buspar  Thinks he is doing better than last time I saw him    Went to switch to Adderall regular because of waking up to late  Would like it to go back to ER. Was doing well on this but just a matter of waking up too late    Attempting [employer] in [place] - shelter like thing but specifically for people that are active users, harm reduction site        Objective    /68   Pulse 85   Resp 16   Wt 53.5 kg (118 lb)   SpO2 97%   BMI 18.90 kg/m    Body mass index is 18.9 kg/m .  Physical Exam       GENERAL: Appears well, in no acute distress  HEENT: Normocephalic, Atraumatic. Sclera WNL. No use of respiratory accessory muscles.  CARDIAC: No apparent distress  LUNG: No apparent distress  SKIN: Scars throughout the arms from self-mutilation  NEURO: Answers questions appropriately. No apparent weakness   PSYCH: No apparent distress. Answers questions appropriately          Signed Electronically by: Alexis  LUDIVINA Helton, DO

## 2025-07-16 ENCOUNTER — TELEPHONE (OUTPATIENT)
Dept: FAMILY MEDICINE | Facility: CLINIC | Age: 30
End: 2025-07-16
Payer: COMMERCIAL

## 2025-07-16 LAB — TESTOST SERPL-MCNC: 82 NG/DL (ref 8–950)

## 2025-07-16 NOTE — TELEPHONE ENCOUNTER
Patient did receive his adderall, but needs a prior auth on his testosterone Gel.  Will begin this process.  JUAN CARLOS Vazquez RN

## 2025-07-16 NOTE — TELEPHONE ENCOUNTER
Prior Authorization Retail Medication Request    Medication/Dose: testosterone (Androgel 1.62% Pump) 20.25 mg/act gel  Diagnosis and ICD code (if different than what is on RX):  Gender dysphoria F46.9  New/renewal/insurance change PA/secondary ins. PA:  Previously Tried and Failed:  Increase   Rationale:  Getting menstrual cycles     Insurance   Primary: See Chart  Insurance ID:  See Chart    Secondary (if applicable): See Chart  Insurance ID:  See Chart    Pharmacy Information (if different than what is on RX)  Name:  [facility]  Phone:  [phone]  Fax:[phone]    Clinic Information  [facility]

## 2025-07-17 ENCOUNTER — OFFICE VISIT (OUTPATIENT)
Dept: DERMATOLOGY | Facility: CLINIC | Age: 30
End: 2025-07-17
Payer: COMMERCIAL

## 2025-07-17 DIAGNOSIS — L72.0 EIC (EPIDERMAL INCLUSION CYST): ICD-10-CM

## 2025-07-17 DIAGNOSIS — L70.0 ACNE VULGARIS: Primary | ICD-10-CM

## 2025-07-17 DIAGNOSIS — D49.2 NEOPLASM OF UNSPECIFIED BEHAVIOR OF BONE, SOFT TISSUE, AND SKIN: ICD-10-CM

## 2025-07-17 RX ORDER — TRETINOIN 0.5 MG/G
CREAM TOPICAL AT BEDTIME
Qty: 45 G | Refills: 4 | Status: SHIPPED | OUTPATIENT
Start: 2025-07-17

## 2025-07-17 NOTE — PATIENT INSTRUCTIONS
Proper skin care from [facility]:    -Eliminate harsh soaps as they strip the natural oils from the skin, often resulting in dry itchy skin ( i.e. Dial, Zest, [place])  -Use mild soaps such as Cetaphil or Dove Sensitive Skin in the shower. You do not need to use soap on arms, legs, and trunk every time you shower unless visibly soiled.   -Avoid hot or cold showers.  -After showering, lightly (pat) dry off and apply moisturizing within 2-3 minutes. This will help trap moisture in the skin.   -Aggressive use of a moisturizer at least 1-2 times a day to the entire body (including -Vanicream, Cetaphil, Aquaphor or Cerave) and moisturize hands after every washing.  -We recommend using moisturizers that come in a tub that needs to be scooped out, not a pump. This has more of an oil base. It will hold moisture in your skin much better than a water base moisturizer. The above recommended are non-pore clogging.      Wear a sunscreen with at least SPF 30 on your face, ears, neck and V of the chest daily. Wear sunscreen on other areas of the body if those areas are exposed to the sun throughout the day. Sunscreens can contain physical and/or chemical blockers. Physical blockers are less likely to clog pores, these include zinc oxide and titanium dioxide. Reapply every two hour and after swimming.     Sunscreen examples: https://www.ewg.org/sunscreen/    UV radiation  UVA radiation remains constant throughout the day and throughout the year. It is a longer wavelength than UVB and therefore penetrates deeper into the skin leading to immediate and delayed tanning, photoaging, and skin cancer. 70-80% of UVA and UVB radiation occurs between the hours of 10am-2pm.  UVB radiation  UVB radiation causes the most harmful effects and is more significant during the summer months. However, snow and ice can reflect UVB radiation leading to skin damage during the winter months as well. UVB radiation is responsible for  tanning, burning, inflammation, delayed erythema (pinkness), pigmentation (brown spots), and skin cancer.     I recommend self monthly full body exams and yearly full body exams with a dermatology provider. If you develop a new or changing lesion please follow up for examination. Most skin cancers are pink and scaly or pink and pearly. However, we do see blue/brown/black skin cancers.  Consider the ABCDEs of melanoma when giving yourself your monthly full body exam ( don't forget the groin, buttocks, feet, toes, etc). A-asymmetry, B-borders, C-color, D-diameter, E-elevation or evolving. If you see any of these changes please follow up in clinic. If you cannot see your back I recommend purchasing a hand held mirror to use with a larger wall mirror.       Checking for Skin Cancer  You can find cancer early by checking your skin each month. There are 3 kinds of skin cancer. They are melanoma, basal cell carcinoma, and squamous cell carcinoma. Doing monthly skin checks is the best way to find new marks or skin changes. Follow the instructions below for checking your skin.   The ABCDEs of checking moles for melanoma   Check your moles or growths for signs of melanoma using ABCDE:   Asymmetry: the sides of the mole or growth don t match  Border: the edges are ragged, notched, or blurred  Color: the color within the mole or growth varies  Diameter: the mole or growth is larger than 6 mm (size of a pencil eraser)  Evolving: the size, shape, or color of the mole or growth is changing (evolving is not shown in the images below)    Checking for other types of skin cancer  Basal cell carcinoma or squamous cell carcinoma have symptoms such as:     A spot or mole that looks different from all other marks on your skin  Changes in how an area feels, such as itching, tenderness, or pain  Changes in the skin's surface, such as oozing, bleeding, or scaliness  A sore that does not heal  New swelling or redness beyond the border of a  mole    Who s at risk?  Anyone can get skin cancer. But you are at greater risk if you have:   Fair skin, light-colored hair, or light-colored eyes  Many moles or abnormal moles on your skin  A history of sunburns from sunlight or tanning beds  A family history of skin cancer  A history of exposure to radiation or chemicals  A weakened immune system  If you have had skin cancer in the past, you are at risk for recurring skin cancer.   How to check your skin  Do your monthly skin checkups in front of a full-length mirror. Check all parts of your body, including your:   Head (ears, face, neck, and scalp)  Torso (front, back, and sides)  Arms (tops, undersides, upper, and lower armpits)  Hands (palms, backs, and fingers, including under the nails)  Buttocks and genitals  Legs (front, back, and sides)  Feet (tops, soles, toes, including under the nails, and between toes)  If you have a lot of moles, take digital photos of them each month. Make sure to take photos both up close and from a distance. These can help you see if any moles change over time.   Most skin changes are not cancer. But if you see any changes in your skin, call your doctor right away. Only he or she can diagnose a problem. If you have skin cancer, seeing your doctor can be the first step toward getting the treatment that could save your life.   [employer] last reviewed this educational content on 4/1/2019 2000-2020 [employer]. [address] [place] [address], [place] [address]. All rights reserved. This information is not intended as a substitute for professional medical care. Always follow your healthcare professional's instructions.       When should I call my doctor?  If you are worsening or not improving, please, contact us or seek urgent care as noted below.     Who should I call with questions (adults)?    [facility] [phone]  For urgent needs outside of business hours call the [facility] at  [phone] and ask for the dermatology resident on call to be paged  If this is a medical emergency and you are unable to reach an ER, Call [phone]      If you need a prescription refill, please contact your pharmacy. Refills are approved or denied by our Physicians during normal business hours, Monday through Friday.  Per office policy, refills will not be granted if you have not been seen within the past year (or sooner depending on the condition).

## 2025-07-17 NOTE — PROGRESS NOTES
"[facility] Dermatology Note  Encounter Date: Jul 17, 2025  Office Visit     Reviewed patient's past medical history and pertinent chart review prior to patient's visit today.     Dermatology Problem List:  # Acne  - tretinoin 0.05% cream    ____________________________________________    Assessment & Plan:     # Acne  - Start tretinoin 0.05% cream every evening. A \"pea\" sized amount should be applied nightly. Start by applying every third night, increase to nightly as tolerated. Moisturize to limit irritation.     # Neoplasm of uncertain behavior:  right nasal tip  DDx includes milia vs fibrous papule vs nevus. Shave biopsy today.    - Reviewed benign appearing nature of the lesion. No treatment is required. The patient reports the lesion is bothersome to him, and is interested in removal.  We reviewed shave biopsy, including risks of scarring and recurrence of the lesion.  The patient wished to undergo shave biopsy.    Procedure Note: Biopsy by shave technique  The risks and benefits of the procedure were described to the patient. These include but are not limited to bleeding, infection, scar, incomplete removal, and non-diagnostic biopsy. Verbal informed consent was obtained. The above site(s) was cleansed with an alcohol pad and injected with 1% lidocaine with epinephrine. Once anesthesia was obtained, a biopsy(ies) was performed with Gilette blade. The tissue(s) was placed in a labeled container(s) with formalin and sent to pathology. Hemostasis was achieved with aluminum chloride. Vaseline and a bandage were applied to the wound(s). The patient tolerated the procedure well and was given post biopsy care instructions.    # Epidermal inclusion cyst, right lateral ankle  - We discussed the benign nature of the skin lesion. No treatment is required. I recommend continued observation with follow up should any concerning changes arise.     # Onychorrhexis   - We discussed that this is a common " "change that can occur secondary to normal physiologic changes. Keeping nails shorter may prevent trauma. No evidence of fungus today.       Follow up as needed.     All risks, benefits and alternatives were discussed with patient.  Patient is in agreement and understands the assessment and plan.  All questions were answered.  Puja Saini PA-C  [facility]  _______________________________________    CC: Derm Problem (1.  Bump on outer R ankle/2.  Bump on nose, previously removed x 2  painful at times/3.  Question Re: toenail texture/4.  Discuss acne/)    HPI:  Mr. Jerson Munoz is a(n) 29 year old adult who presents today as a new patient for multiple concerns. Patient is otherwise feeling well, without additional skin concerns.    Concern 1:   - Lesion on the nose  - \"removed\" twice before, unsure of how  - Lesion has grown pain  - Occasionally painful    Concern 2:   - Lesion below the skin on the right lateral ankle  - Present for years  - Grown over time  - No pain, itching, or bleeding at the site    Concern 3:   - Toenail change in color and texture  - Began years ago    Concern 4:   - Acne of the face, chest and back  - Present for years  - No current treatments    Physical Exam:  SKIN: Focused examination of face, back, and feet was performed.  - The right nasal tip demonstrates a 0.2 x 0.2 cm white papule.   - Scattered on the face and back are inflammatory papules and pustules.   - The right lateral ankle demonstrates a 5 mm mobile nodule.   - Subtle grooves involving bilateral toenails.      - No other lesions of concern on areas examined.     Medications:  Current Outpatient Medications   Medication Sig Dispense Refill    amphetamine-dextroamphetamine (ADDERALL) 20 MG tablet Take 1 tablet (20 mg) by mouth daily. 30 tablet 0    busPIRone (BUSPAR) 10 MG tablet TAKE ONE TABLET BY MOUTH TWICE A  tablet 1    fexofenadine (ALLEGRA) 180 MG tablet Take 180 mg by mouth daily.      " amphetamine-dextroamphetamine (ADDERALL XR) 30 MG 24 hr capsule Take 1 capsule (30 mg) by mouth daily. 30 capsule 0    [START ON 8/13/2025] amphetamine-dextroamphetamine (ADDERALL XR) 30 MG 24 hr capsule Take 1 capsule (30 mg) by mouth daily. 30 capsule 0    [START ON 9/12/2025] amphetamine-dextroamphetamine (ADDERALL XR) 30 MG 24 hr capsule Take 1 capsule (30 mg) by mouth daily. 30 capsule 0    sertraline (ZOLOFT) 50 MG tablet Take 1 tablet (50 mg) by mouth daily. Half tablet for 7 days then increase to full tablet 90 tablet 1    testosterone (ANDROGEL 1.62 % PUMP) 20.25 MG/ACT gel Place 3 Pump (60.75 mg) onto the skin daily. 75 g 0     No current facility-administered medications for this visit.      Past Medical History:   Patient Active Problem List   Diagnosis    Gender dysphoria in adult    Generalized anxiety disorder    Attention deficit hyperactivity disorder (ADHD), combined type    Major depressive disorder, recurrent episode, moderate (H)    Chronic rhinitis    Dyslipidemia    Vitamin D deficiency    Menstrual changes    Social anxiety disorder    Migraine headache     Past Medical History:   Diagnosis Date    Attention deficit hyperactivity disorder (ADHD), combined type 1/27/2016    Migraines        CC Alexis Helton DO  [address] [place] [address] on close of this encounter.

## 2025-07-17 NOTE — LETTER
"7/17/2025      Jerson Munoz  [address] [place] [address]      Dear Colleague,    Thank you for referring your patient, Jerson Munoz, to the [facility]. Please see a copy of my visit note below.    [facility] Dermatology Note  Encounter Date: Jul 17, 2025  Office Visit     Reviewed patient's past medical history and pertinent chart review prior to patient's visit today.     Dermatology Problem List:  # Acne  - tretinoin 0.05% cream    ____________________________________________    Assessment & Plan:     # Acne  - Start tretinoin 0.05% cream every evening. A \"pea\" sized amount should be applied nightly. Start by applying every third night, increase to nightly as tolerated. Moisturize to limit irritation.     # Neoplasm of uncertain behavior:  right nasal tip  DDx includes milia vs fibrous papule vs nevus. Shave biopsy today.    - Reviewed benign appearing nature of the lesion. No treatment is required. The patient reports the lesion is bothersome to him, and is interested in removal.  We reviewed shave biopsy, including risks of scarring and recurrence of the lesion.  The patient wished to undergo shave biopsy.    Procedure Note: Biopsy by shave technique  The risks and benefits of the procedure were described to the patient. These include but are not limited to bleeding, infection, scar, incomplete removal, and non-diagnostic biopsy. Verbal informed consent was obtained. The above site(s) was cleansed with an alcohol pad and injected with 1% lidocaine with epinephrine. Once anesthesia was obtained, a biopsy(ies) was performed with Gilette blade. The tissue(s) was placed in a labeled container(s) with formalin and sent to pathology. Hemostasis was achieved with aluminum chloride. Vaseline and a bandage were applied to the wound(s). The patient tolerated the procedure well and was given post biopsy care instructions.    # Epidermal inclusion cyst, right " "lateral ankle  - We discussed the benign nature of the skin lesion. No treatment is required. I recommend continued observation with follow up should any concerning changes arise.     # Onychorrhexis   - We discussed that this is a common change that can occur secondary to normal physiologic changes. Keeping nails shorter may prevent trauma. No evidence of fungus today.       Follow up as needed.     All risks, benefits and alternatives were discussed with patient.  Patient is in agreement and understands the assessment and plan.  All questions were answered.  Puja Saini PA-C  [facility]  _______________________________________    CC: Derm Problem (1.  Bump on outer R ankle/2.  Bump on nose, previously removed x 2  painful at times/3.  Question Re: toenail texture/4.  Discuss acne/)    HPI:  Mr. Jerosn Munoz is a(n) 29 year old adult who presents today as a new patient for multiple concerns. Patient is otherwise feeling well, without additional skin concerns.    Concern 1:   - Lesion on the nose  - \"removed\" twice before, unsure of how  - Lesion has grown pain  - Occasionally painful    Concern 2:   - Lesion below the skin on the right lateral ankle  - Present for years  - Grown over time  - No pain, itching, or bleeding at the site    Concern 3:   - Toenail change in color and texture  - Began years ago    Concern 4:   - Acne of the face, chest and back  - Present for years  - No current treatments    Physical Exam:  SKIN: Focused examination of face, back, and feet was performed.  - The right nasal tip demonstrates a 0.2 x 0.2 cm white papule.   - Scattered on the face and back are inflammatory papules and pustules.   - The right lateral ankle demonstrates a 5 mm mobile nodule.   - Subtle grooves involving bilateral toenails.      - No other lesions of concern on areas examined.     Medications:  Current Outpatient Medications   Medication Sig Dispense Refill     " amphetamine-dextroamphetamine (ADDERALL) 20 MG tablet Take 1 tablet (20 mg) by mouth daily. 30 tablet 0     busPIRone (BUSPAR) 10 MG tablet TAKE ONE TABLET BY MOUTH TWICE A  tablet 1     fexofenadine (ALLEGRA) 180 MG tablet Take 180 mg by mouth daily.       amphetamine-dextroamphetamine (ADDERALL XR) 30 MG 24 hr capsule Take 1 capsule (30 mg) by mouth daily. 30 capsule 0     [START ON 8/13/2025] amphetamine-dextroamphetamine (ADDERALL XR) 30 MG 24 hr capsule Take 1 capsule (30 mg) by mouth daily. 30 capsule 0     [START ON 9/12/2025] amphetamine-dextroamphetamine (ADDERALL XR) 30 MG 24 hr capsule Take 1 capsule (30 mg) by mouth daily. 30 capsule 0     sertraline (ZOLOFT) 50 MG tablet Take 1 tablet (50 mg) by mouth daily. Half tablet for 7 days then increase to full tablet 90 tablet 1     testosterone (ANDROGEL 1.62 % PUMP) 20.25 MG/ACT gel Place 3 Pump (60.75 mg) onto the skin daily. 75 g 0     No current facility-administered medications for this visit.      Past Medical History:   Patient Active Problem List   Diagnosis     Gender dysphoria in adult     Generalized anxiety disorder     Attention deficit hyperactivity disorder (ADHD), combined type     Major depressive disorder, recurrent episode, moderate (H)     Chronic rhinitis     Dyslipidemia     Vitamin D deficiency     Menstrual changes     Social anxiety disorder     Migraine headache     Past Medical History:   Diagnosis Date     Attention deficit hyperactivity disorder (ADHD), combined type 1/27/2016     Migraines        CC Alexis Helton,   [address] [place] [address] on close of this encounter.     Again, thank you for allowing me to participate in the care of your patient.        Sincerely,        Puja Saini PA-C    Electronically signed

## 2025-07-19 ENCOUNTER — HEALTH MAINTENANCE LETTER (OUTPATIENT)
Age: 30
End: 2025-07-19

## 2025-07-21 LAB
PATH REPORT.COMMENTS IMP SPEC: NORMAL
PATH REPORT.COMMENTS IMP SPEC: NORMAL
PATH REPORT.FINAL DX SPEC: NORMAL
PATH REPORT.GROSS SPEC: NORMAL
PATH REPORT.MICROSCOPIC SPEC OTHER STN: NORMAL
PATH REPORT.RELEVANT HX SPEC: NORMAL

## 2025-07-21 NOTE — TELEPHONE ENCOUNTER
Prior Authorization Not Needed per Insurance    Medication: testosterone (ANDROGEL 1.62 % PUMP) 20.25 MG/ACT gel-PA NOT NEEDED   Insurance Company: [employer] - Phone [phone] Fax [phone]  Expected CoPay:      Pharmacy Filling the Rx: [id number] - [place], [place] - [address]  Pharmacy Notified:  Yes  Patient Notified:  No    Pharmacy stated that PA is Not Needed and medication is covered. **Instructed pharmacy to notify patient when script is ready to /ship.** Pharmacy stated that they have a paid claim on medication quantity 75 grams per 20 day supply and pharmacy will notify patient on medication.

## 2025-08-16 ENCOUNTER — MYC REFILL (OUTPATIENT)
Dept: FAMILY MEDICINE | Facility: CLINIC | Age: 30
End: 2025-08-16
Payer: COMMERCIAL

## 2025-08-16 DIAGNOSIS — F33.1 MAJOR DEPRESSIVE DISORDER, RECURRENT EPISODE, MODERATE (H): Chronic | ICD-10-CM

## 2025-08-16 DIAGNOSIS — F90.2 ATTENTION DEFICIT HYPERACTIVITY DISORDER (ADHD), COMBINED TYPE: ICD-10-CM

## 2025-08-16 DIAGNOSIS — F41.1 GENERALIZED ANXIETY DISORDER: ICD-10-CM

## 2025-08-16 DIAGNOSIS — F64.0 GENDER DYSPHORIA IN ADULT: ICD-10-CM

## 2025-08-18 RX ORDER — TESTOSTERONE 1.62 MG/G
3 GEL TRANSDERMAL DAILY
Qty: 75 G | Refills: 0 | Status: SHIPPED | OUTPATIENT
Start: 2025-08-18

## 2025-08-18 RX ORDER — BUSPIRONE HYDROCHLORIDE 10 MG/1
10 TABLET ORAL 2 TIMES DAILY
Qty: 180 TABLET | Refills: 1 | Status: SHIPPED | OUTPATIENT
Start: 2025-08-18

## 2025-08-18 RX ORDER — DEXTROAMPHETAMINE SACCHARATE, AMPHETAMINE ASPARTATE MONOHYDRATE, DEXTROAMPHETAMINE SULFATE AND AMPHETAMINE SULFATE 7.5; 7.5; 7.5; 7.5 MG/1; MG/1; MG/1; MG/1
30 CAPSULE, EXTENDED RELEASE ORAL DAILY
Qty: 30 CAPSULE | Refills: 0 | Status: SHIPPED | OUTPATIENT
Start: 2025-08-18